# Patient Record
Sex: FEMALE | Race: WHITE | Employment: OTHER | URBAN - METROPOLITAN AREA
[De-identification: names, ages, dates, MRNs, and addresses within clinical notes are randomized per-mention and may not be internally consistent; named-entity substitution may affect disease eponyms.]

---

## 2019-04-06 ENCOUNTER — APPOINTMENT (OUTPATIENT)
Dept: CT IMAGING | Age: 64
End: 2019-04-06
Payer: COMMERCIAL

## 2019-04-06 ENCOUNTER — HOSPITAL ENCOUNTER (OUTPATIENT)
Age: 64
Discharge: HOME | End: 2019-04-06
Payer: COMMERCIAL

## 2019-04-06 ENCOUNTER — HOSPITAL ENCOUNTER (EMERGENCY)
Age: 64
Discharge: ANOTHER HOSPITAL | End: 2019-04-06
Attending: EMERGENCY MEDICINE
Payer: COMMERCIAL

## 2019-04-06 ENCOUNTER — APPOINTMENT (OUTPATIENT)
Dept: GENERAL RADIOLOGY | Age: 64
End: 2019-04-06
Payer: COMMERCIAL

## 2019-04-06 ENCOUNTER — HOSPITAL ENCOUNTER (INPATIENT)
Age: 64
LOS: 4 days | Discharge: HOME | DRG: 315 | End: 2019-04-10
Attending: INTERNAL MEDICINE | Admitting: INTERNAL MEDICINE
Payer: COMMERCIAL

## 2019-04-06 VITALS
HEIGHT: 58 IN | HEART RATE: 112 BPM | SYSTOLIC BLOOD PRESSURE: 133 MMHG | TEMPERATURE: 99.4 F | RESPIRATION RATE: 18 BRPM | WEIGHT: 120 LBS | DIASTOLIC BLOOD PRESSURE: 63 MMHG | OXYGEN SATURATION: 98 % | BODY MASS INDEX: 25.19 KG/M2

## 2019-04-06 DIAGNOSIS — D64.9 ANEMIA, UNSPECIFIED TYPE: ICD-10-CM

## 2019-04-06 DIAGNOSIS — J98.11 COMPRESSION ATELECTASIS: ICD-10-CM

## 2019-04-06 DIAGNOSIS — J90 PLEURAL EFFUSION, LEFT: ICD-10-CM

## 2019-04-06 DIAGNOSIS — I31.39 PERICARDIAL EFFUSION: Primary | ICD-10-CM

## 2019-04-06 DIAGNOSIS — J96.01 ACUTE RESPIRATORY FAILURE WITH HYPOXIA (HCC): ICD-10-CM

## 2019-04-06 LAB
ANION GAP SERPL CALCULATED.3IONS-SCNC: 13 MMOL/L (ref 7–16)
APTT: 26 SEC (ref 24.5–35.1)
BASOPHILS ABSOLUTE: 0.03 E9/L (ref 0–0.2)
BASOPHILS RELATIVE PERCENT: 0.3 % (ref 0–2)
BUN BLDV-MCNC: 12 MG/DL (ref 8–23)
C-REACTIVE PROTEIN: 24.5 MG/DL (ref 0–0.4)
CALCIUM SERPL-MCNC: 8.6 MG/DL (ref 8.6–10.2)
CHLORIDE BLD-SCNC: 105 MMOL/L (ref 98–107)
CO2: 23 MMOL/L (ref 22–29)
CREAT SERPL-MCNC: 0.5 MG/DL (ref 0.5–1)
D DIMER: 3510 NG/ML DDU
EOSINOPHILS ABSOLUTE: 0.01 E9/L (ref 0.05–0.5)
EOSINOPHILS RELATIVE PERCENT: 0.1 % (ref 0–6)
GFR AFRICAN AMERICAN: >60
GFR NON-AFRICAN AMERICAN: >60 ML/MIN/1.73
GLUCOSE BLD-MCNC: 112 MG/DL (ref 74–99)
HCT VFR BLD CALC: 27.8 % (ref 34–48)
HEMOGLOBIN: 8.9 G/DL (ref 11.5–15.5)
IMMATURE GRANULOCYTES #: 0.05 E9/L
IMMATURE GRANULOCYTES %: 0.4 % (ref 0–5)
INFLUENZA A BY PCR: NOT DETECTED
INFLUENZA B BY PCR: NOT DETECTED
INR BLD: 1.5
LACTIC ACID: 1 MMOL/L (ref 0.5–2.2)
LV EF: 60 %
LVEF MODALITY: NORMAL
LYMPHOCYTES ABSOLUTE: 2.03 E9/L (ref 1.5–4)
LYMPHOCYTES RELATIVE PERCENT: 18 % (ref 20–42)
MCH RBC QN AUTO: 29.6 PG (ref 26–35)
MCHC RBC AUTO-ENTMCNC: 32 % (ref 32–34.5)
MCV RBC AUTO: 92.4 FL (ref 80–99.9)
MONOCYTES ABSOLUTE: 1.02 E9/L (ref 0.1–0.95)
MONOCYTES RELATIVE PERCENT: 9.1 % (ref 2–12)
NEUTROPHILS ABSOLUTE: 8.11 E9/L (ref 1.8–7.3)
NEUTROPHILS RELATIVE PERCENT: 72.1 % (ref 43–80)
PDW BLD-RTO: 13.3 FL (ref 11.5–15)
PLATELET # BLD: 457 E9/L (ref 130–450)
PMV BLD AUTO: 8.7 FL (ref 7–12)
POTASSIUM REFLEX MAGNESIUM: 3.9 MMOL/L (ref 3.5–5)
PRO-BNP: 588 PG/ML (ref 0–125)
PROTHROMBIN TIME: 16.4 SEC (ref 9.3–12.4)
RBC # BLD: 3.01 E12/L (ref 3.5–5.5)
SEDIMENTATION RATE, ERYTHROCYTE: 130 MM/HR (ref 0–20)
SODIUM BLD-SCNC: 141 MMOL/L (ref 132–146)
TROPONIN: <0.01 NG/ML (ref 0–0.03)
WBC # BLD: 11.3 E9/L (ref 4.5–11.5)

## 2019-04-06 PROCEDURE — 85730 THROMBOPLASTIN TIME PARTIAL: CPT

## 2019-04-06 PROCEDURE — 6360000004 HC RX CONTRAST MEDICATION: Performed by: RADIOLOGY

## 2019-04-06 PROCEDURE — 36415 COLL VENOUS BLD VENIPUNCTURE: CPT

## 2019-04-06 PROCEDURE — 2580000003 HC RX 258: Performed by: RADIOLOGY

## 2019-04-06 PROCEDURE — 83880 ASSAY OF NATRIURETIC PEPTIDE: CPT

## 2019-04-06 PROCEDURE — 71045 X-RAY EXAM CHEST 1 VIEW: CPT

## 2019-04-06 PROCEDURE — 99223 1ST HOSP IP/OBS HIGH 75: CPT | Performed by: INTERNAL MEDICINE

## 2019-04-06 PROCEDURE — 84484 ASSAY OF TROPONIN QUANT: CPT

## 2019-04-06 PROCEDURE — 2000000000 HC ICU R&B

## 2019-04-06 PROCEDURE — 6370000000 HC RX 637 (ALT 250 FOR IP): Performed by: INTERNAL MEDICINE

## 2019-04-06 PROCEDURE — 93306 TTE W/DOPPLER COMPLETE: CPT

## 2019-04-06 PROCEDURE — 85651 RBC SED RATE NONAUTOMATED: CPT

## 2019-04-06 PROCEDURE — 99253 IP/OBS CNSLTJ NEW/EST LOW 45: CPT | Performed by: THORACIC SURGERY (CARDIOTHORACIC VASCULAR SURGERY)

## 2019-04-06 PROCEDURE — 99285 EMERGENCY DEPT VISIT HI MDM: CPT

## 2019-04-06 PROCEDURE — 85610 PROTHROMBIN TIME: CPT

## 2019-04-06 PROCEDURE — 85025 COMPLETE CBC W/AUTO DIFF WBC: CPT

## 2019-04-06 PROCEDURE — 83605 ASSAY OF LACTIC ACID: CPT

## 2019-04-06 PROCEDURE — 80048 BASIC METABOLIC PNL TOTAL CA: CPT

## 2019-04-06 PROCEDURE — 2580000003 HC RX 258: Performed by: EMERGENCY MEDICINE

## 2019-04-06 PROCEDURE — 94660 CPAP INITIATION&MGMT: CPT

## 2019-04-06 PROCEDURE — A0426 ALS 1: HCPCS

## 2019-04-06 PROCEDURE — 87502 INFLUENZA DNA AMP PROBE: CPT

## 2019-04-06 PROCEDURE — 71260 CT THORAX DX C+: CPT

## 2019-04-06 PROCEDURE — A0425 GROUND MILEAGE: HCPCS

## 2019-04-06 PROCEDURE — 85378 FIBRIN DEGRADE SEMIQUANT: CPT

## 2019-04-06 PROCEDURE — 86140 C-REACTIVE PROTEIN: CPT

## 2019-04-06 RX ORDER — ROSUVASTATIN CALCIUM 20 MG/1
20 TABLET, COATED ORAL DAILY
Status: DISCONTINUED | OUTPATIENT
Start: 2019-04-06 | End: 2019-04-10 | Stop reason: HOSPADM

## 2019-04-06 RX ORDER — DOXYCYCLINE HYCLATE 100 MG/1
100 CAPSULE ORAL 2 TIMES DAILY
Status: ON HOLD | COMMUNITY
Start: 2019-04-02 | End: 2019-04-10 | Stop reason: HOSPADM

## 2019-04-06 RX ORDER — SODIUM CHLORIDE 0.9 % (FLUSH) 0.9 %
10 SYRINGE (ML) INJECTION PRN
Status: DISCONTINUED | OUTPATIENT
Start: 2019-04-06 | End: 2019-04-10 | Stop reason: HOSPADM

## 2019-04-06 RX ORDER — PANTOPRAZOLE SODIUM 40 MG/1
40 TABLET, DELAYED RELEASE ORAL
Status: DISCONTINUED | OUTPATIENT
Start: 2019-04-07 | End: 2019-04-10 | Stop reason: HOSPADM

## 2019-04-06 RX ORDER — PANTOPRAZOLE SODIUM 40 MG/10ML
40 INJECTION, POWDER, LYOPHILIZED, FOR SOLUTION INTRAVENOUS ONCE
Status: DISCONTINUED | OUTPATIENT
Start: 2019-04-06 | End: 2019-04-06

## 2019-04-06 RX ORDER — B-COMPLEX WITH VITAMIN C
1 TABLET ORAL 2 TIMES DAILY
COMMUNITY
End: 2019-05-23 | Stop reason: ALTCHOICE

## 2019-04-06 RX ORDER — ONDANSETRON 2 MG/ML
4 INJECTION INTRAMUSCULAR; INTRAVENOUS EVERY 6 HOURS PRN
Status: DISCONTINUED | OUTPATIENT
Start: 2019-04-06 | End: 2019-04-10 | Stop reason: HOSPADM

## 2019-04-06 RX ORDER — KETOTIFEN FUMARATE 0.35 MG/ML
1 SOLUTION/ DROPS OPHTHALMIC 2 TIMES DAILY
Status: DISCONTINUED | OUTPATIENT
Start: 2019-04-06 | End: 2019-04-08

## 2019-04-06 RX ORDER — KETOTIFEN FUMARATE 0.35 MG/ML
1 SOLUTION/ DROPS OPHTHALMIC 2 TIMES DAILY PRN
COMMUNITY
End: 2019-05-23 | Stop reason: ALTCHOICE

## 2019-04-06 RX ORDER — ROSUVASTATIN CALCIUM 20 MG/1
20 TABLET, COATED ORAL
COMMUNITY

## 2019-04-06 RX ORDER — SODIUM CHLORIDE 0.9 % (FLUSH) 0.9 %
10 SYRINGE (ML) INJECTION EVERY 12 HOURS SCHEDULED
Status: DISCONTINUED | OUTPATIENT
Start: 2019-04-06 | End: 2019-04-10 | Stop reason: HOSPADM

## 2019-04-06 RX ORDER — SODIUM CHLORIDE 0.9 % (FLUSH) 0.9 %
10 SYRINGE (ML) INJECTION 2 TIMES DAILY
Status: DISCONTINUED | OUTPATIENT
Start: 2019-04-06 | End: 2019-04-06 | Stop reason: HOSPADM

## 2019-04-06 RX ORDER — 0.9 % SODIUM CHLORIDE 0.9 %
1000 INTRAVENOUS SOLUTION INTRAVENOUS ONCE
Status: COMPLETED | OUTPATIENT
Start: 2019-04-06 | End: 2019-04-06

## 2019-04-06 RX ORDER — SODIUM CHLORIDE 0.9 % (FLUSH) 0.9 %
10 SYRINGE (ML) INJECTION PRN
Status: DISCONTINUED | OUTPATIENT
Start: 2019-04-06 | End: 2019-04-06 | Stop reason: HOSPADM

## 2019-04-06 RX ADMIN — SODIUM CHLORIDE 1000 ML: 9 INJECTION, SOLUTION INTRAVENOUS at 12:00

## 2019-04-06 RX ADMIN — SODIUM CHLORIDE 1000 ML: 9 INJECTION, SOLUTION INTRAVENOUS at 13:55

## 2019-04-06 RX ADMIN — IOPAMIDOL 65 ML: 755 INJECTION, SOLUTION INTRAVENOUS at 13:11

## 2019-04-06 RX ADMIN — Medication 10 ML: at 13:11

## 2019-04-06 RX ADMIN — ROSUVASTATIN CALCIUM 20 MG: 20 TABLET, FILM COATED ORAL at 20:29

## 2019-04-06 RX ADMIN — KETOTIFEN FUMARATE 1 DROP: 0.35 SOLUTION/ DROPS OPHTHALMIC at 20:30

## 2019-04-06 ASSESSMENT — ENCOUNTER SYMPTOMS
SHORTNESS OF BREATH: 1
NAUSEA: 0
CONSTIPATION: 0
STRIDOR: 0
WHEEZING: 0
COUGH: 1
DIARRHEA: 0
VOMITING: 0
RHINORRHEA: 0
CHEST TIGHTNESS: 1
SINUS PAIN: 0
BLOOD IN STOOL: 0
ABDOMINAL PAIN: 0

## 2019-04-06 ASSESSMENT — PAIN DESCRIPTION - DESCRIPTORS: DESCRIPTORS: PATIENT UNABLE TO DESCRIBE

## 2019-04-06 ASSESSMENT — PAIN SCALES - GENERAL
PAINLEVEL_OUTOF10: 0
PAINLEVEL_OUTOF10: 8

## 2019-04-06 ASSESSMENT — PAIN DESCRIPTION - FREQUENCY: FREQUENCY: CONTINUOUS

## 2019-04-06 ASSESSMENT — PAIN DESCRIPTION - LOCATION: LOCATION: CHEST

## 2019-04-06 ASSESSMENT — PAIN DESCRIPTION - PROGRESSION: CLINICAL_PROGRESSION: NOT CHANGED

## 2019-04-06 ASSESSMENT — PAIN DESCRIPTION - ONSET: ONSET: GRADUAL

## 2019-04-06 ASSESSMENT — PAIN DESCRIPTION - PAIN TYPE: TYPE: ACUTE PAIN

## 2019-04-06 ASSESSMENT — PAIN DESCRIPTION - ORIENTATION: ORIENTATION: LOWER;RIGHT;LEFT

## 2019-04-06 ASSESSMENT — PAIN - FUNCTIONAL ASSESSMENT: PAIN_FUNCTIONAL_ASSESSMENT: ACTIVITIES ARE NOT PREVENTED

## 2019-04-06 NOTE — PROGRESS NOTES
Date: 4/6/2019    Time: 5:08 PM    Patient Placed On BIPAP/CPAP/ Non-Invasive Ventilation? Yes    If no must comment. Facial area red/color change? No           If YES are Blister/Lesion present? No   If yes must notify nursing staff  BIPAP/CPAP skin barrier? No    Skin barrier type:none, nasal prongs in use       Comments: Placed on bipap in 4403, transfer from [facility].         Melissa العراقي

## 2019-04-06 NOTE — CONSULTS
[facility])  Department of Internal Medicine   MICU H&P Note    Patient:  Ayla Or 59 y.o. female   MRN: [id number]       Date of Service: 4/6/2019      Chief Complaint:  sob    History of Present Illness      History obtained from patient    This is a 59year old female with a PMHx of HLD and osteoporosis who presented to [facility] ED with sob. Symptoms started 1 week prior suddenly and progressively worsened, occurring both at rest and on exertion. Associated with non-productive cough, fever, chest discomfort. Denies palpitations, diaphoresis, orthopnea, PND, leg swelling or tenderness, nausea, vomiting, diarrhea, dysuria, sick contacts or long distance travel. She had called her PCP 4/2/19 and was given doxycycline, but did not experience any relief. She then made her way to [facility] ED, where she was found to be hypoxic and tachycardic. CT chest showing a large pericardial effusion 2.7 cm, Echo done showed EF 60% effusion 1.5 cm-1.8cm without tamponade, mild PHTN 40 mmHg. Vascular was consulted and the decision was made to transfer her to [facility] MICU for further management. PastMedical History:      Diagnosis Date    Hyperlipidemia     Osteoporosis        Past Surgical History:        Procedure Laterality Date    CYST REMOVAL      L leg    LEG SURGERY      fx repair    TONSILLECTOMY         Prior to Admission medications    Medication Sig Start Date End Date Taking?  Authorizing Provider   Alendronate Sodium (FOSAMAX PO) Take 70 mg by mouth     Historical Provider, MD   NONFORMULARY Cholesterol med-does not know the name    Historical Provider, MD   doxycycline hyclate (VIBRAMYCIN) 100 MG capsule Take 100 mg by mouth 2 times daily 4/2/19   Historical Provider, MD   rosuvastatin (CRESTOR) 20 MG tablet Take 20 mg by mouth daily    Historical Provider, MD   ketotifen (ZADITOR) 0.025 % ophthalmic solution Place 1 drop into both eyes 2 times daily    Historical Provider, MD   Cholecalciferol (VITAMIN D3) 5000 units TABS Take 1 tablet by mouth daily    Historical Provider, MD   calcium carbonate-vitamin D (RA CALCIUM PLUS VITAMIN D) 600-200 MG-UNIT TABS Take 1 tablet by mouth 2 times daily    Historical Provider, MD       Allergies:  Penicillins    Social History:   TOBACCO:   reports that she has never smoked. She has never used smokeless tobacco.  ETOH:   reports that she drinks alcohol. OCCUPATION:      Family History:       Problem Relation Age of Onset    Diabetes Father         REVIEW OF SYSTEMS:  Review of Systems   Constitutional: Positive for activity change and fever. Negative for chills, diaphoresis and fatigue. HENT: Negative for congestion, rhinorrhea and sinus pain. Respiratory: Positive for cough, chest tightness and shortness of breath. Negative for wheezing and stridor. Cardiovascular: Positive for chest pain. Negative for palpitations and leg swelling. Gastrointestinal: Negative for abdominal pain, blood in stool, constipation, diarrhea, nausea and vomiting. Genitourinary: Negative for dysuria, frequency and hematuria. Musculoskeletal: Positive for arthralgias. Negative for gait problem and myalgias. Skin: Negative for rash. Allergic/Immunologic: Negative for environmental allergies. Neurological: Negative for dizziness, tremors, syncope, weakness, numbness and headaches. Hematological: Does not bruise/bleed easily. Psychiatric/Behavioral: Negative for suicidal ideas. Physical Exam       Physical Exam:  · Vitals: /73   Pulse 110   Temp 98.4 °F (36.9 °C) (Temporal)   Resp (!) 35   Ht 4' 11\" (1.499 m)   Wt 128 lb 8 oz (58.3 kg)   SpO2 97%   BMI 25.95 kg/m²     · I & O - 24hr: No intake/output data recorded. Physical Exam   Constitutional: She is oriented to person, place, and time. She appears well-developed and well-nourished. No distress. HENT:   Head: Normocephalic and atraumatic. Eyes: Pupils are equal, round, and reactive to light.  Conjunctivae are normal. No scleral icterus. Cardiovascular: Normal rate, regular rhythm, S1 normal, S2 normal, normal heart sounds and intact distal pulses. No murmur heard. Pulmonary/Chest: Effort normal. No respiratory distress. She has no wheezes. She has no rales. Currently on Bi-PAP    Left U/L and right LL decreased bs   Abdominal: Soft. Bowel sounds are normal. She exhibits no distension and no mass. There is no tenderness. There is no guarding. Musculoskeletal: She exhibits no edema or tenderness. Neurological: She is alert and oriented to person, place, and time. No cranial nerve deficit. Skin: Skin is warm and dry. She is not diaphoretic. Labs     CBC:   Lab Results   Component Value Date    WBC 11.3 04/06/2019    RBC 3.01 04/06/2019    HGB 8.9 04/06/2019    HCT 27.8 04/06/2019     04/06/2019    MCV 92.4 04/06/2019     BMP:  @BRIEFLAB(NA,K,CL,CO2,BUN,CREATININE,GLUCOSE,CALCIUM)@  Hepatic Function Panel:  No results found for: ALKPHOS, AST, ALT, PROT, LABALBU, BILITOT  Cardiac Enzymes: @RESUFAST(CKTOTAL:3,CKMB:3,CKMBINDEX:3,TROPONINI:3)@  PT/INR:    Lab Results   Component Value Date    PROTIME 16.4 04/06/2019    INR 1.5 04/06/2019     ABG:  No results found for: PHART, MLU0TYK, PO2ART, R3UULIYH, OSF1LSY, BEART  TSH:  No results found for: TSH    Resident's Assessment & Plan       Cardiovascular  Pericardial effusion  - likely 2/2 pericarditis, will continue evaluate for etiology  - no obvious evidence of cardiac tamponade or hemodynamic compromise  - CT chest showing large pericardial effusion 2.7 cm, Echo done showed EF 60% effusion 1.5 cm - 1.8cm without tamponade(no RV free wall or right atrial wall diastolic collapse, mild PHTN 40 mmHg.   - , CRP 24.5, troponin negative  - EKG not showing low voltage or pulsus alternans  - CTS consulted for possible pericardiocentesis  - obtain SHRADDHA, HIV, Hepatitis panel, viral panel, IgG4, procalcitonin, TSH,   - consider JV for further evaluation  - for incentive spirometry  - monitor vital signs closely    Hematology  Normocytic anemia  - Hb 8.9 on admission  - obtain iron studies, b12, folate  - obtain FOBT  - monitor CBC daily      DVT/GI prophylaxis: PCD/Protonix  Disposition: Jos Milligan M.D. , PGY-1  Internal Medicine    Attending Physician: Dr. Robbie Valdez ICU Attending Statement     Esther Tang was seen, examined and discussed with the multi-disciplinary ICU team during rounds. A addendum note may be separate to the residents note. If not then, I have personally seen and examined the patient and the key elements of the encounter were performed by me (> 85 % time). The medications & laboratory data was discussed and adjusted where necessary. The radiographic images were reviewed either as a group or with radiologist.  Any changes are document or changed if felt dis-concordant with the exam or history. The above findings were corroborated, plans confirmed and changes made if needed. Family was updated at the bedside as available. Key issues of the case were discussed among consultants. Critical Care time is documented if appropriate.       Lucila Calvo DO, MPH  Professor of Medicine

## 2019-04-06 NOTE — PROGRESS NOTES
Date: 4/6/2019    Time: 3:06 PM    Patient Placed On BIPAP/CPAP/ Non-Invasive Ventilation? Yes    If no must comment. Facial area red/color change? No           If YES are Blister/Lesion present? No   If yes must notify nursing staff  BIPAP/CPAP skin barrier? No    Skin barrier type:othernasal prongs      Comments: does not tolerate full/nasal mask, nasal prongs used.         Valerie Barge

## 2019-04-06 NOTE — ED PROVIDER NOTES
Department of Emergency Medicine   ED  Provider Note  Admit Date/RoomTime: 4/6/2019 10:34 AM  ED Room: 23/23          History of Present Illness:  4/6/19, Time: 11:11 AM         Katy Ruffin is a 59 y.o. female presenting to the ED for shortness of breath, beginning 1 week ago. The complaint has been persistent, severe in severity, and worsened by any exertion. She called in to her PMD last week and was given an Rx for doxycycline which did not help her symptoms at all. She went to the [facility] today and was noted to be hypoxic at rest (87%) which worsened with ambulation to 83%. She denies any productive cough. No recent travel or leg swelling. Review of Systems:   Pertinent positives and negatives are stated within HPI, all other systems reviewed and are negative.        --------------------------------------------- PAST HISTORY ---------------------------------------------  Past Medical History:  has a past medical history of Hyperlipidemia and Osteoporosis. Past Surgical History:  has a past surgical history that includes cyst removal; Tonsillectomy; and Leg Surgery. Social History:  reports that she has never smoked. She has never used smokeless tobacco. She reports that she drinks alcohol. She reports that she does not use drugs. Family History: family history includes Diabetes in her father. The patients home medications have been reviewed. Allergies: Penicillins        ---------------------------------------------------PHYSICAL EXAM--------------------------------------    Constitutional/General: Alert and oriented x3  Head: Normocephalic and atraumatic  Eyes: PERRL, EOMI, conjunctiva normal, sclera non icteric  Mouth: Oropharynx clear, handling secretions, no trismus, no asymmetry of the posterior oropharynx or uvular edema  Neck: Supple, full ROM, no stridor, no meningeal signs  Respiratory: Lungs clear to auscultation bilaterally, no wheezes, rales, or rhonchi.  Not in respiratory distress  Cardiovascular:  Tachycardic. Regular rhythm. Pericardial friction rub appreciated  Chest: No chest wall tenderness  GI:  Abdomen Soft, Non tender, Non distended. +BS. No rebound, guarding, or rigidity. No pulsatile masses. Musculoskeletal: Moves all extremities x 4. Warm and well perfused, no clubbing, cyanosis, or edema. Capillary refill <3 seconds  Integument: skin warm and dry. No rashes. Neurologic: GCS 15, no focal deficits, symmetric strength 5/5 in the upper and lower extremities bilaterally  Psychiatric: Normal Affect          -------------------------------------------------- RESULTS -------------------------------------------------  I have personally reviewed all laboratory and imaging results for this patient. Results are listed below.      LABS:  Results for orders placed or performed during the hospital encounter of 04/06/19   Rapid influenza A/B antigens   Result Value Ref Range    Influenza A by PCR Not Detected Not Detected    Influenza B by PCR Not Detected Not Detected   CBC Auto Differential   Result Value Ref Range    WBC 11.3 4.5 - 11.5 E9/L    RBC 3.01 (L) 3.50 - 5.50 E12/L    Hemoglobin 8.9 (L) 11.5 - 15.5 g/dL    Hematocrit 27.8 (L) 34.0 - 48.0 %    MCV 92.4 80.0 - 99.9 fL    MCH 29.6 26.0 - 35.0 pg    MCHC 32.0 32.0 - 34.5 %    RDW 13.3 11.5 - 15.0 fL    Platelets 155 (H) 373 - 450 E9/L    MPV 8.7 7.0 - 12.0 fL    Neutrophils % 72.1 43.0 - 80.0 %    Immature Granulocytes % 0.4 0.0 - 5.0 %    Lymphocytes % 18.0 (L) 20.0 - 42.0 %    Monocytes % 9.1 2.0 - 12.0 %    Eosinophils % 0.1 0.0 - 6.0 %    Basophils % 0.3 0.0 - 2.0 %    Neutrophils # 8.11 (H) 1.80 - 7.30 E9/L    Immature Granulocytes # 0.05 E9/L    Lymphocytes # 2.03 1.50 - 4.00 E9/L    Monocytes # 1.02 (H) 0.10 - 0.95 E9/L    Eosinophils # 0.01 (L) 0.05 - 0.50 E9/L    Basophils # 0.03 0.00 - 0.20 U8/K   Basic Metabolic Panel w/ Reflex to MG   Result Value Ref Range    Sodium 141 132 - 146 mmol/L    Potassium tachycardia  Rate: normal  Axis: normal  Ectopy: none  Conduction: normal  ST Segments: no acute change  T Waves: no acute change  Q Waves: none    Clinical Impression: no acute changes    Chelsey Mitchell      ------------------------------ ED COURSE/MEDICAL DECISION MAKING----------------------  Medications   sodium chloride flush 0.9 % injection 10 mL (has no administration in time range)   sodium chloride flush 0.9 % injection 10 mL (10 mLs Intravenous Given 4/6/19 1311)   perflutren lipid microspheres (DEFINITY) injection 1.65 mg (has no administration in time range)   0.9 % sodium chloride bolus (0 mLs Intravenous Stopped 4/6/19 1329)   iopamidol (ISOVUE-370) 76 % injection 65 mL (65 mLs Intravenous Given 4/6/19 1311)   0.9 % sodium chloride bolus (1,000 mLs Intravenous New Bag 4/6/19 1355)       Procedures  BEDSIDE CARDIAC ULTRASOUND    Risks, benefits and alternatives (for applicable procedures below) described. Performed By: Chelsey Mitchell DO. Indication(s):  SOB, effusion  Informed consent: The patient provided verbal consent for this procedure. .  Procedural Quadrants/Interpretations:     SUBXYPHOID:  Abnormal  anechoic stripe seen around the heart. PSLA: Views of mitral valve were obtained. EPSS 6.8 mm  PSSA: Regional wall motion abnormalities  were not seen  APICAL 4-CHAMBER: 4 chamber views were obtained. Findings: Pericardial effusion was present - large  Dilation or systolic collapse of RV was seen. TAPSE 14 mm  EF grossly estimated normal    This procedure was performed by Chelsey Mitchell on 4/6/19 at 4:16 PM         Medical Decision Making:    Patient presents to the ED c/o SOB. She was referred here by the urgent care to rule out PE. D-dimer was initially ordered but chest x-ray showed a substantially enlarged heart and a blunted diaphragm and she had a CTA Chest ordered which demonstrated a large pericardial effusion.  Bedside ultrasound was done and showed the effusion which was even visible from the left lower lobe lung area. Limited views obtained appeared consistent with RV but not RA collapse. Care was coordinated with multiple consultants to adequately care for the patient. She was given IVF to maximize her preload and decrease the risk of decompensation. She was also started on BIPAP but required the nasal BIPAP as she could not tolerate the mask. The BIPAP substantially improved her work of breathing and she appeared much more comfortable. Re-Evaluations:             ED Course as of Apr 06 1616   Sat Apr 06, 2019   1347 Dr. Jeremy Herring will read stat echo    [AD]   1478 Discussed case with Dr. Julio Urena who will see the patient in consultation after transfer. He states there will not be anything to do today since she is hemodynamically stable.    [AD]   1112 Discussed case with Dr. Zeferino Ling who will admit the patient.      [AD]   [phone] Dr. Paolo Yee accepted via [facility] text. [AD]      ED Course User Index  [AD] Rafy Chapman, DO     Please note that the withdrawal or failure to initiate urgent interventions for this patient would likely result in a life threatening deterioration or permanent disability. Accordingly this patient received 90 minutes of critical care time, excluding separately billable procedures. Counseling: The emergency provider has spoken with the patient and discussed todays results, in addition to providing specific details for the plan of care and counseling regarding the diagnosis and prognosis. Questions are answered at this time and they are agreeable with the plan.       --------------------------------- IMPRESSION AND DISPOSITION ---------------------------------    IMPRESSION  1. Pericardial effusion    2. Acute respiratory failure with hypoxia (HCC)    3. Compression atelectasis    4. Pleural effusion, left    5.  Anemia, unspecified type        DISPOSITION  Disposition: Transfer to [facility]  Patient condition is serious        NOTE: This report was transcribed using voice recognition software.  Every effort was made to ensure accuracy; however, inadvertent computerized transcription errors may be present        Dayanara Lisa DO  Resident  04/06/19 5641

## 2019-04-06 NOTE — CONSULTS
CARDIOTHORACIC SURGERY  CONSULT NOTE  4/6/2019    Physician Consulted: Dr. Elaine Figueroa  Reason for Consult: Pericardial effusion  Referring Physician: Dr. Wilner Ruggiero    JEVON Welsh is a 59 y.o. female who presents for evaluation of shortness of breath at rest and with exertion for one week. Treated by PCP w/ doxycycline, but symptoms did not improve. Went to [facility] ER and found to have a large pericardial effusion with bilateral pleural effusions. She denied fever, chills, weight loss, abdominal pain and n/v. No sick contacts. No recent travels. Non-smoker. Past Medical History:   Diagnosis Date    Hyperlipidemia     Osteoporosis        Past Surgical History:   Procedure Laterality Date    CYST REMOVAL      L leg    LEG SURGERY      fx repair    TONSILLECTOMY         Medications Prior to Admission:    Prior to Admission medications    Medication Sig Start Date End Date Taking?  Authorizing Provider   Alendronate Sodium (FOSAMAX PO) Take 70 mg by mouth     Historical Provider, MD   NONFORMULARY Cholesterol med-does not know the name    Historical Provider, MD   doxycycline hyclate (VIBRAMYCIN) 100 MG capsule Take 100 mg by mouth 2 times daily 4/2/19   Historical Provider, MD   rosuvastatin (CRESTOR) 20 MG tablet Take 20 mg by mouth daily    Historical Provider, MD   ketotifen (ZADITOR) 0.025 % ophthalmic solution Place 1 drop into both eyes 2 times daily    Historical Provider, MD   Cholecalciferol (VITAMIN D3) 5000 units TABS Take 1 tablet by mouth daily    Historical Provider, MD   calcium carbonate-vitamin D (RA CALCIUM PLUS VITAMIN D) 600-200 MG-UNIT TABS Take 1 tablet by mouth 2 times daily    Historical Provider, MD       Allergies   Allergen Reactions    Penicillins Rash       Family History   Problem Relation Age of Onset    Diabetes Father        Social History     Tobacco Use    Smoking status: Never Smoker    Smokeless tobacco: Never Used   Substance Use Topics    Alcohol use: Yes     Comment: special occasions    Drug use: No         Review of Systems   General ROS: negative for - chills, fatigue, fever, night sweats or weight loss  Hematological and Lymphatic ROS: negative for - jaundice, night sweats or pallor  Respiratory ROS: positive for - shortness of breath  Cardiovascular ROS: positive dyspnea on exertion  Gastrointestinal ROS: no abdominal pain, change in bowel habits, or black or bloody stools  Genito-Urinary ROS: no dysuria, trouble voiding, or hematuria  Musculoskeletal ROS: negative for - muscle pain or muscular weakness      PHYSICAL EXAM:    Vitals:    19 1800   BP: 122/78   Pulse: 109   Resp: 29   Temp:    SpO2:        General: NAD, awake and alert. A&Ox3  Head: Normocephalic, atraumatic  Eyes: PERRLA, EOMI. No sclera icterus. Lungs: No increased work of breathing. Decreased breath sounds on the left. No W/R/R. Cardiovascular: RRR. Muffled heart sounds. Abdomen: Soft, ND, NT. +BS. No rebound, guarding or rigidity. Extremities: Atraumatic, full range of motion  Skin: Warm, dry and intact    LABS:  CBC  Recent Labs     19  1156   WBC 11.3   HGB 8.9*   HCT 27.8*   *     BMP  Recent Labs     19  1156      K 3.9      CO2 23   BUN 12   CREATININE 0.5   CALCIUM 8.6     Liver Function  No results for input(s): AMYLASE, LIPASE, BILITOT, BILIDIR, AST, ALT, ALKPHOS, PROT, LABALBU in the last 72 hours. No results for input(s): LACTATE in the last 72 hours. Recent Labs     19  1156   INR 1.5       RADIOLOGY  Xr Chest Portable    Result Date: 2019  Patient MRN:  [id number] : 1955 Age: 59 years Gender: Female Order Date:  2019 11:00 AM Exam: XR CHEST PORTABLE Number of views: Portable upright AP view of the chest, 1 image(s) Indication: Shortness of breath Comparison: Chest radiograph dated 7/3/2015 FINDINGS:  The cardiac silhouette appears large.  There is silhouetting of the left hemidiaphragm and left cardiac margin, compatible with the presence of pleural fluid, atelectasis, and/or airspace consolidation in the left lower lung. There is no pneumothorax. Findings compatible with left pleural effusion and associated atelectasis and/or airspace consolidation in the left lower lung. Cardiomegaly. Ct Chest Pulmonary Embolism W Contrast    Result Date: 4/6/2019  Clinical indications: Chest pain. Pulmonary embolus. COMPARISON: Conventional radiographs of the chest April 6, 2019 and July 3, 2015. No prior CT chest. No-year-old on Mr. Document well the head Exposure control: This examination and all examinations utilizing ionizing radiation at this facility done so according to the ALARA (as low as reasonably achievable) principal for radiation dose reduction. Technique: Axial, sagittal, and coronal computed tomography of the chest was performed following intravenous administration of 90 mL of Isovue-370. 3-D postprocessing. Three-dimensional reconstructions of the arterial tree. MIP imaging. FINDINGS: Evaluation of the pulmonary arterial tree reveals no intraluminal filling defect to suggest embolic phenomenon. There is no evidence of aortic dissection. Within the thoracic inlet, the thyroid gland is unremarkable. The major airways are patent. There is no mediastinal or hilar mass or lymphadenopathy. There is a pericardial fluid collection which measures about 2.7 cm in greatest thickness/cross-section. The heart is upper limits normal in size. Evaluation of the lung parenchyma reveals small right and moderate left pleural effusions with contiguous atelectasis. The lungs are negative for dominant mass or airspace consolidation. There is no evidence for effusion, pneumothorax or severe interstitial change. Within the upper abdomen, there is no evidence for acute or worrisome process. Skeletal structures are negative for evidence of progressive process or acute fracture. No evidence for pulmonary embolism or aortic dissection.  Large pericardial effusion measuring up to 2.7 cm in greatest thickness/cross section. Small right and moderate left pleural effusions with contiguous atelectasis. ASSESSMENT:  59 y.o. female with a pericardial effusion and bilateral pleural effusions    PLAN:  No acute surgical intervention at this time  Diet as tolerated  Attending to see and make further recommendations    D/w Dr. Guy Lozano    Electronically signed by Lorin Field MD on 4/6/19 at 6:24 PM      As above. Seen and examined. Above findings confirmed. Pericardial effusion. BP fine.   Drainage tomorrow at [facility]

## 2019-04-07 ENCOUNTER — APPOINTMENT (OUTPATIENT)
Dept: ULTRASOUND IMAGING | Age: 64
DRG: 315 | End: 2019-04-07
Attending: INTERNAL MEDICINE
Payer: COMMERCIAL

## 2019-04-07 ENCOUNTER — APPOINTMENT (OUTPATIENT)
Dept: GENERAL RADIOLOGY | Age: 64
DRG: 315 | End: 2019-04-07
Attending: INTERNAL MEDICINE
Payer: COMMERCIAL

## 2019-04-07 ENCOUNTER — ANESTHESIA EVENT (OUTPATIENT)
Dept: OPERATING ROOM | Age: 64
DRG: 315 | End: 2019-04-07
Payer: COMMERCIAL

## 2019-04-07 LAB
ABO/RH: NORMAL
ACETAMINOPHEN LEVEL: <5 MCG/ML (ref 10–30)
ALBUMIN SERPL-MCNC: 3.1 G/DL (ref 3.5–5.2)
ALP BLD-CCNC: 170 U/L (ref 35–104)
ALT SERPL-CCNC: 155 U/L (ref 0–32)
AMPHETAMINE SCREEN, URINE: NOT DETECTED
ANION GAP SERPL CALCULATED.3IONS-SCNC: 15 MMOL/L (ref 7–16)
ANTIBODY SCREEN: NORMAL
AST SERPL-CCNC: 90 U/L (ref 0–31)
BACTERIA: ABNORMAL /HPF
BARBITURATE SCREEN URINE: NOT DETECTED
BASOPHILS ABSOLUTE: 0.02 E9/L (ref 0–0.2)
BASOPHILS RELATIVE PERCENT: 0.2 % (ref 0–2)
BENZODIAZEPINE SCREEN, URINE: NOT DETECTED
BILIRUB SERPL-MCNC: 0.4 MG/DL (ref 0–1.2)
BILIRUBIN DIRECT: <0.2 MG/DL (ref 0–0.3)
BILIRUBIN URINE: ABNORMAL
BILIRUBIN, INDIRECT: ABNORMAL MG/DL (ref 0–1)
BLOOD, URINE: ABNORMAL
BUN BLDV-MCNC: 11 MG/DL (ref 8–23)
CALCIUM SERPL-MCNC: 8.1 MG/DL (ref 8.6–10.2)
CANNABINOID SCREEN URINE: NOT DETECTED
CHLORIDE BLD-SCNC: 108 MMOL/L (ref 98–107)
CLARITY: CLEAR
CO2: 18 MMOL/L (ref 22–29)
COCAINE METABOLITE SCREEN URINE: NOT DETECTED
COLOR: YELLOW
CREAT SERPL-MCNC: 0.5 MG/DL (ref 0.5–1)
EOSINOPHILS ABSOLUTE: 0.02 E9/L (ref 0.05–0.5)
EOSINOPHILS RELATIVE PERCENT: 0.2 % (ref 0–6)
ETHANOL: <10 MG/DL (ref 0–0.08)
FERRITIN: 1085 NG/ML
FOLATE: 15.7 NG/ML (ref 4.8–24.2)
GFR AFRICAN AMERICAN: >60
GFR NON-AFRICAN AMERICAN: >60 ML/MIN/1.73
GLUCOSE BLD-MCNC: 113 MG/DL (ref 74–99)
GLUCOSE URINE: NEGATIVE MG/DL
HCT VFR BLD CALC: 30.3 % (ref 34–48)
HEMOGLOBIN: 9.5 G/DL (ref 11.5–15.5)
IMMATURE GRANULOCYTES #: 0.04 E9/L
IMMATURE GRANULOCYTES %: 0.4 % (ref 0–5)
IRON SATURATION: 10 % (ref 15–50)
IRON: 16 MCG/DL (ref 37–145)
KETONES, URINE: 15 MG/DL
LEUKOCYTE ESTERASE, URINE: NEGATIVE
LYMPHOCYTES ABSOLUTE: 1.98 E9/L (ref 1.5–4)
LYMPHOCYTES RELATIVE PERCENT: 21.9 % (ref 20–42)
MCH RBC QN AUTO: 28.7 PG (ref 26–35)
MCHC RBC AUTO-ENTMCNC: 31.4 % (ref 32–34.5)
MCV RBC AUTO: 91.5 FL (ref 80–99.9)
METHADONE SCREEN, URINE: NOT DETECTED
MONOCYTES ABSOLUTE: 0.81 E9/L (ref 0.1–0.95)
MONOCYTES RELATIVE PERCENT: 9 % (ref 2–12)
NEUTROPHILS ABSOLUTE: 6.16 E9/L (ref 1.8–7.3)
NEUTROPHILS RELATIVE PERCENT: 68.3 % (ref 43–80)
NITRITE, URINE: NEGATIVE
OPIATE SCREEN URINE: NOT DETECTED
PDW BLD-RTO: 13.3 FL (ref 11.5–15)
PH UA: 6 (ref 5–9)
PHENCYCLIDINE SCREEN URINE: NOT DETECTED
PLATELET # BLD: 492 E9/L (ref 130–450)
PMV BLD AUTO: 8.7 FL (ref 7–12)
POTASSIUM REFLEX MAGNESIUM: 3.6 MMOL/L (ref 3.5–5)
PROCALCITONIN: 0.1 NG/ML (ref 0–0.08)
PROPOXYPHENE SCREEN: NOT DETECTED
PROTEIN UA: 30 MG/DL
RBC # BLD: 3.31 E12/L (ref 3.5–5.5)
RBC UA: ABNORMAL /HPF (ref 0–2)
RHEUMATOID FACTOR: 14 IU/ML (ref 0–13)
SALICYLATE, SERUM: <0.3 MG/DL (ref 0–30)
SODIUM BLD-SCNC: 141 MMOL/L (ref 132–146)
SPECIFIC GRAVITY UA: >=1.03 (ref 1–1.03)
TOTAL IRON BINDING CAPACITY: 168 MCG/DL (ref 250–450)
TOTAL PROTEIN: 7.1 G/DL (ref 6.4–8.3)
TRICYCLIC ANTIDEPRESSANTS SCREEN SERUM: NEGATIVE NG/ML
TSH SERPL DL<=0.05 MIU/L-ACNC: 3.01 UIU/ML (ref 0.27–4.2)
UROBILINOGEN, URINE: 1 E.U./DL
VITAMIN B-12: 1433 PG/ML (ref 211–946)
WBC # BLD: 9 E9/L (ref 4.5–11.5)
WBC UA: ABNORMAL /HPF (ref 0–5)

## 2019-04-07 PROCEDURE — 6360000002 HC RX W HCPCS: Performed by: THORACIC SURGERY (CARDIOTHORACIC VASCULAR SURGERY)

## 2019-04-07 PROCEDURE — 86850 RBC ANTIBODY SCREEN: CPT

## 2019-04-07 PROCEDURE — 85025 COMPLETE CBC W/AUTO DIFF WBC: CPT

## 2019-04-07 PROCEDURE — 83540 ASSAY OF IRON: CPT

## 2019-04-07 PROCEDURE — 36415 COLL VENOUS BLD VENIPUNCTURE: CPT

## 2019-04-07 PROCEDURE — 2000000000 HC ICU R&B

## 2019-04-07 PROCEDURE — 84145 PROCALCITONIN (PCT): CPT

## 2019-04-07 PROCEDURE — 94660 CPAP INITIATION&MGMT: CPT

## 2019-04-07 PROCEDURE — 99233 SBSQ HOSP IP/OBS HIGH 50: CPT | Performed by: INTERNAL MEDICINE

## 2019-04-07 PROCEDURE — 80074 ACUTE HEPATITIS PANEL: CPT

## 2019-04-07 PROCEDURE — 87040 BLOOD CULTURE FOR BACTERIA: CPT

## 2019-04-07 PROCEDURE — 83550 IRON BINDING TEST: CPT

## 2019-04-07 PROCEDURE — 86225 DNA ANTIBODY NATIVE: CPT

## 2019-04-07 PROCEDURE — 86038 ANTINUCLEAR ANTIBODIES: CPT

## 2019-04-07 PROCEDURE — 80076 HEPATIC FUNCTION PANEL: CPT

## 2019-04-07 PROCEDURE — 71045 X-RAY EXAM CHEST 1 VIEW: CPT

## 2019-04-07 PROCEDURE — 80048 BASIC METABOLIC PNL TOTAL CA: CPT

## 2019-04-07 PROCEDURE — G0480 DRUG TEST DEF 1-7 CLASSES: HCPCS

## 2019-04-07 PROCEDURE — 82728 ASSAY OF FERRITIN: CPT

## 2019-04-07 PROCEDURE — 86900 BLOOD TYPING SEROLOGIC ABO: CPT

## 2019-04-07 PROCEDURE — 93970 EXTREMITY STUDY: CPT

## 2019-04-07 PROCEDURE — 80307 DRUG TEST PRSMV CHEM ANLYZR: CPT

## 2019-04-07 PROCEDURE — 82746 ASSAY OF FOLIC ACID SERUM: CPT

## 2019-04-07 PROCEDURE — 82607 VITAMIN B-12: CPT

## 2019-04-07 PROCEDURE — 86901 BLOOD TYPING SEROLOGIC RH(D): CPT

## 2019-04-07 PROCEDURE — 86703 HIV-1/HIV-2 1 RESULT ANTBDY: CPT

## 2019-04-07 PROCEDURE — 87186 SC STD MICRODIL/AGAR DIL: CPT

## 2019-04-07 PROCEDURE — 84443 ASSAY THYROID STIM HORMONE: CPT

## 2019-04-07 PROCEDURE — 86431 RHEUMATOID FACTOR QUANT: CPT

## 2019-04-07 PROCEDURE — 87088 URINE BACTERIA CULTURE: CPT

## 2019-04-07 PROCEDURE — P9046 ALBUMIN (HUMAN), 25%, 20 ML: HCPCS | Performed by: THORACIC SURGERY (CARDIOTHORACIC VASCULAR SURGERY)

## 2019-04-07 PROCEDURE — 6370000000 HC RX 637 (ALT 250 FOR IP)

## 2019-04-07 PROCEDURE — 2580000003 HC RX 258: Performed by: INTERNAL MEDICINE

## 2019-04-07 PROCEDURE — 81001 URINALYSIS AUTO W/SCOPE: CPT

## 2019-04-07 RX ORDER — ALBUMIN (HUMAN) 12.5 G/50ML
25 SOLUTION INTRAVENOUS ONCE
Status: COMPLETED | OUTPATIENT
Start: 2019-04-07 | End: 2019-04-07

## 2019-04-07 RX ORDER — ACETAMINOPHEN 325 MG/1
TABLET ORAL
Status: COMPLETED
Start: 2019-04-07 | End: 2019-04-07

## 2019-04-07 RX ORDER — CEFAZOLIN SODIUM 2 G/50ML
2 SOLUTION INTRAVENOUS SEE ADMIN INSTRUCTIONS
Status: COMPLETED | OUTPATIENT
Start: 2019-04-07 | End: 2019-04-08

## 2019-04-07 RX ADMIN — KETOTIFEN FUMARATE 1 DROP: 0.35 SOLUTION/ DROPS OPHTHALMIC at 08:20

## 2019-04-07 RX ADMIN — Medication 10 ML: at 20:21

## 2019-04-07 RX ADMIN — ACETAMINOPHEN 650 MG: 325 TABLET, FILM COATED ORAL at 20:20

## 2019-04-07 RX ADMIN — ALBUMIN (HUMAN) 25 G: 0.25 INJECTION, SOLUTION INTRAVENOUS at 08:19

## 2019-04-07 RX ADMIN — KETOTIFEN FUMARATE 1 DROP: 0.35 SOLUTION/ DROPS OPHTHALMIC at 20:21

## 2019-04-07 ASSESSMENT — PAIN SCALES - GENERAL
PAINLEVEL_OUTOF10: 0

## 2019-04-07 NOTE — H&P
[facility]  [facility] and Physical      CHIEF COMPLAINT:  Shortness of breath    Reason for Admission:  Pericardial effusion    History Obtained From:  Patient and     PCP :  Dar Hayden MD    [address] / [place] [facility] [address]      HISTORY OF PRESENT ILLNESS:      The patient is a 59 y.o. female presents to the [facility] emergency room sent in by dr Analy Henley from the office. Patient apparently was treated previously with doxycycline for URI symptoms. This did not really help. Patient was also noted to be hypoxic. In the emergency room she was noted to have pericardial effusion as well as bilateral pleural effusions. She does not have any history of rheumatologic disease or cancer. She was then not transferred over to our institution for pericardiocentesis. Family members by the bedside giving history as well.     Past Medical History:        Diagnosis Date    Hyperlipidemia     Osteoporosis      Past Surgical History:        Procedure Laterality Date    CYST REMOVAL      L leg    LEG SURGERY      fx repair    TONSILLECTOMY           Medications Prior to Admission:    Medications Prior to Admission: Alendronate Sodium (FOSAMAX PO), Take 70 mg by mouth   NONFORMULARY, Cholesterol med-does not know the name  doxycycline hyclate (VIBRAMYCIN) 100 MG capsule, Take 100 mg by mouth 2 times daily  rosuvastatin (CRESTOR) 20 MG tablet, Take 20 mg by mouth daily  ketotifen (ZADITOR) 0.025 % ophthalmic solution, Place 1 drop into both eyes 2 times daily  Cholecalciferol (VITAMIN D3) 5000 units TABS, Take 1 tablet by mouth daily  calcium carbonate-vitamin D (RA CALCIUM PLUS VITAMIN D) 600-200 MG-UNIT TABS, Take 1 tablet by mouth 2 times daily    Allergies:  Penicillins    Social History:   Social History     Socioeconomic History    Marital status:      Spouse name: Not on file    Number of children: Not on file    Years of education: Not on file    Highest education level: Not on file   Occupational History    Not on file   Social Needs    Financial resource strain: Not on file    Food insecurity:     Worry: Not on file     Inability: Not on file    Transportation needs:     Medical: Not on file     Non-medical: Not on file   Tobacco Use    Smoking status: Never Smoker    Smokeless tobacco: Never Used   Substance and Sexual Activity    Alcohol use: Yes     Comment: special occasions    Drug use: No    Sexual activity: Not on file   Lifestyle    Physical activity:     Days per week: Not on file     Minutes per session: Not on file    Stress: Not on file   Relationships    Social connections:     Talks on phone: Not on file     Gets together: Not on file     Attends Jew service: Not on file     Active member of club or organization: Not on file     Attends meetings of clubs or organizations: Not on file     Relationship status: Not on file    Intimate partner violence:     Fear of current or ex partner: Not on file     Emotionally abused: Not on file     Physically abused: Not on file     Forced sexual activity: Not on file   Other Topics Concern    Not on file   Social History Narrative    Not on file         Family History:       Problem Relation Age of Onset    Diabetes Father        REVIEW OF SYSTEMS:    General ROS: Positive for chills, weakness  Hematological and Lymphatic ROS: negative  Endocrine ROS: negative  Respiratory ROS: Positive for dyspnea on exertion Cardiovascular ROS: no chest pain or dyspnea on exertion  Gastrointestinal ROS: no abdominal pain, change in bowel habits, or black or bloody stools  Genito-Urinary ROS: no dysuria, trouble voiding, or hematuria  Neurological ROS: no TIA or stroke symptoms  negative    Vitals:  /78   Pulse 96   Temp 98.8 °F (37.1 °C) (Temporal)   Resp (!) 39   Ht 4' 11\" (1.499 m)   Wt 128 lb 4.9 oz (58.2 kg)   SpO2 98%   BMI 25.91 kg/m²     PHYSICAL EXAM:  General:  Awake, alert, oriented X 3.   Well developed, well nourished, well groomed. No apparent distress. HEENT:  Normocephalic, atraumatic. Pupils equal, round, reactive to light. No scleral icterus. No conjunctival injection. Neck:  Supple, no carotid bruits  Heart:  Regular tachycardia  Lungs:  CTA bilaterally, decreased breath sounds bases  Abdomen:   Bowel sounds present, soft, nontender, no masses, no organomegaly, no peritoneal signs  Extremities:  No clubbing, cyanosis, or edema  Skin:  Warm and dry, no open lesions or rash  Neuro:  Cranial nerves 2-12 intact, no focal deficits      DATA:     Recent Results (from the past 24 hour(s))   EKG 12 Lead    Collection Time: 04/06/19  4:13 PM   Result Value Ref Range    Ventricular Rate 110 BPM    Atrial Rate 110 BPM    P-R Interval 120 ms    QRS Duration 82 ms    Q-T Interval 342 ms    QTc Calculation (Bazett) 462 ms    P Axis 35 degrees    R Axis 23 degrees    T Axis 32 degrees   Sedimentation Rate    Collection Time: 04/06/19  5:10 PM   Result Value Ref Range    Sed Rate 130 (H) 0 - 20 mm/Hr   C-reactive protein    Collection Time: 04/06/19  5:10 PM   Result Value Ref Range    CRP 24.5 (H) 0.0 - 0.4 mg/dL   CBC auto differential    Collection Time: 04/07/19  5:50 AM   Result Value Ref Range    WBC 9.0 4.5 - 11.5 E9/L    RBC 3.31 (L) 3.50 - 5.50 E12/L    Hemoglobin 9.5 (L) 11.5 - 15.5 g/dL    Hematocrit 30.3 (L) 34.0 - 48.0 %    MCV 91.5 80.0 - 99.9 fL    MCH 28.7 26.0 - 35.0 pg    MCHC 31.4 (L) 32.0 - 34.5 %    RDW 13.3 11.5 - 15.0 fL    Platelets 868 (H) 372 - 450 E9/L    MPV 8.7 7.0 - 12.0 fL    Neutrophils % 68.3 43.0 - 80.0 %    Immature Granulocytes % 0.4 0.0 - 5.0 %    Lymphocytes % 21.9 20.0 - 42.0 %    Monocytes % 9.0 2.0 - 12.0 %    Eosinophils % 0.2 0.0 - 6.0 %    Basophils % 0.2 0.0 - 2.0 %    Neutrophils # 6.16 1.80 - 7.30 E9/L    Immature Granulocytes # 0.04 E9/L    Lymphocytes # 1.98 1.50 - 4.00 E9/L    Monocytes # 0.81 0.10 - 0.95 E9/L    Eosinophils # 0.02 (L) 0.05 - 0.50 E9/L

## 2019-04-07 NOTE — ANESTHESIA PRE PROCEDURE
MAGNESIA) 400 MG/5ML suspension 30 mL  30 mL Oral Daily PRN Georgina Reynaga MD        ondansetron (ZOFRAN) injection 4 mg  4 mg Intravenous Q6H PRN Georgina Reynaga MD        pantoprazole (PROTONIX) tablet 40 mg  40 mg Oral QAM AC Giles Cruz MD   Stopped at 04/07/19 0729       Allergies: Allergies   Allergen Reactions    Penicillins Rash       Problem List:    Patient Active Problem List   Diagnosis Code    Chest pain R07.9    Pericardial effusion I31.3       Past Medical History:        Diagnosis Date    Hyperlipidemia     Osteoporosis        Past Surgical History:        Procedure Laterality Date    CYST REMOVAL      L leg    LEG SURGERY      fx repair    TONSILLECTOMY         Social History:    Social History     Tobacco Use    Smoking status: Never Smoker    Smokeless tobacco: Never Used   Substance Use Topics    Alcohol use: Yes     Comment: special occasions                                Counseling given: Not Answered      Vital Signs (Current):   Vitals:    04/07/19 0900 04/07/19 1000 04/07/19 1100 04/07/19 1200   BP: 115/72 111/69 128/72 123/78   Pulse: 108 104 103 96   Resp: 26 29 (!) 39 (!) 39   Temp:    37.1 °C (98.8 °F)   TempSrc:    Temporal   SpO2: 98% 96% 98% 98%   Weight:       Height:                                                  BP Readings from Last 3 Encounters:   04/07/19 123/78   04/06/19 133/63       NPO Status:  Patient and RN instructed to remain NPO past midnight 4/7/2019                                                                               BMI:   Wt Readings from Last 3 Encounters:   04/07/19 128 lb 4.9 oz (58.2 kg)   04/06/19 120 lb (54.4 kg)     Body mass index is 25.91 kg/m².     CBC:   Lab Results   Component Value Date    WBC 9.0 04/07/2019    RBC 3.31 04/07/2019    HGB 9.5 04/07/2019    HCT 30.3 04/07/2019    MCV 91.5 04/07/2019    RDW 13.3 04/07/2019     04/07/2019       CMP:   Lab Results   Component Value Date     04/07/2019    K 3.6 04/07/2019     04/07/2019    CO2 18 04/07/2019    BUN 11 04/07/2019    CREATININE 0.5 04/07/2019    GFRAA >60 04/07/2019    LABGLOM >60 04/07/2019    GLUCOSE 113 04/07/2019    PROT 7.1 04/07/2019    CALCIUM 8.1 04/07/2019    BILITOT 0.4 04/07/2019    ALKPHOS 170 04/07/2019    AST 90 04/07/2019     04/07/2019       POC Tests: No results for input(s): POCGLU, POCNA, POCK, POCCL, POCBUN, POCHEMO, POCHCT in the last 72 hours. Coags:   Lab Results   Component Value Date    PROTIME 16.4 04/06/2019    INR 1.5 04/06/2019    APTT 26.0 04/06/2019       HCG (If Applicable): No results found for: PREGTESTUR, PREGSERUM, HCG, HCGQUANT     ABGs: No results found for: PHART, PO2ART, KEU1NGG, NAP8YBQ, BEART, C0UJFAGR     Type & Screen (If Applicable):  No results found for: LABABO, LABRH     EKG 4/6/2019  Narrative   Sinus tachycardia  Cannot rule out Anterior infarct , age undetermined  Abnormal ECG  No previous ECGs available     ECHO 4/6/2019   Findings      Left Ventricle   Left ventricular size is normal.   Normal LV segmental wall motion, EF 60%.   Normal LV diastolic function.      Right Ventricle   Normal right ventricle structure and function.      Left Atrium   Left atrium is of normal size. Interatrial septum not well visualized but   appears intact.      Right Atrium   Normal right atrium.      Mitral Valve   Normal mitral valve structure and function. No mitral valve prolapse.      Tricuspid Valve   Normal tricuspid valve structure.   There is trace tricuspid regurgitation.   Mild pulmonary hypertension, RVSP 40mmHg.      Aortic Valve   Normal aortic valve structure and function.      Pulmonic Valve   The pulmonic valve was not well visualized.      Pericardial Effusion   Moderate to large circumferential pericardial effusion (1.5cm -   1.8cm)without significant tamponade.  (No RV free wall or right atrial wall   diastolic collapse; IVC not dilated)   Pericardium appears normal.      Pleural Effusion   No evidence of pleural effusion.      Aorta   Normal aortic root and ascending aorta.   Miscellaneous   The inferior vena cava diameter is normal with normal respiratory   variation.      Conclusions      Summary   Moderate to large circumferential pericardial effusion (1.5cm - 1.8cm),   without significant tamponade (no RV free wall or right atrial wall   diastolic collapse; IVC not dilated)   Left ventricular size is normal.   Normal LV segmental wall motion, EF 60%.   Left atrium is of normal size. Interatrial septum not well visualized but   appears intact.   Normal right ventricle structure and function.   Normal right atrium.   Normal mitral valve structure and function.   No mitral valve prolapse.   There is trace tricuspid regurgitation.   Mild pulmonary hypertension, RVSP 40mmHg.   The pulmonic valve was not well visualized.   Normal aortic root and ascending aorta.   Small echogenic masses noted in the pericardial space.   No intracardiac mass.   No recent study to compare. CT  Chest 4/6/2019  Impression       No evidence for pulmonary embolism or aortic dissection.       Large pericardial effusion measuring up to 2.7 cm in greatest   thickness/cross section.       Small right and moderate left pleural effusions with contiguous   atelectasis. Chest Xray 4/7/2019  Findings:       The lungs are clear.  There is no evidence of pulmonary infiltrate or   pleural effusion.  The pulmonary vascularity is unremarkable. There is   cardiomegaly with left ventricular contour. CT of the chest   demonstrate there is a pericardial effusion which cannot be identified   in the chest radiograph. .  The bony thorax demonstrates no gross   abnormality.          Anesthesia Evaluation  Patient summary reviewed and Nursing notes reviewed no history of anesthetic complications:   Airway: Mallampati: II  TM distance: >3 FB   Neck ROM: full  Mouth opening: > = 3 FB Dental: normal exam     Comment: Patient denies any chipped, cracked, loose teeth    Pulmonary:Negative Pulmonary ROS and normal exam  breath sounds clear to auscultation                            ROS comment: Currently on 2L NC  Required BiPap 3/6/19 through night   Cardiovascular:    (+) hyperlipidemia      ECG reviewed  Rhythm: regular  Rate: normal  Echocardiogram reviewed         Beta Blocker:  Not on Beta Blocker      ROS comment: Pericardial effusion- to OR 4/8/2019 w/ Dr. Crys Nettles for pericardiocentesis possible pericardial window     Neuro/Psych:   Negative Neuro/Psych ROS              GI/Hepatic/Renal: Neg GI/Hepatic/Renal ROS            Endo/Other:                      ROS comment: Osteoporosis Abdominal:           Vascular: negative vascular ROS. Anesthesia Plan      general     ASA 4     (R AC 20)  Induction: intravenous. arterial line, central line and JV  MIPS: Prophylactic antiemetics administered and Postoperative trial extubation. Anesthetic plan and risks discussed with patient. Use of blood products discussed with patient whom consented to blood products. Plan discussed with CRNA and attending.                 Pedro Moore RN   4/7/2019

## 2019-04-07 NOTE — PLAN OF CARE
Problem: Risk for Impaired Skin Integrity  Goal: Tissue integrity - skin and mucous membranes  Description  Structural intactness and normal physiological function of skin and  mucous membranes.   Outcome: Met This Shift     Problem: Falls - Risk of:  Goal: Will remain free from falls  Description  Will remain free from falls  Outcome: Met This Shift     Problem: Falls - Risk of:  Goal: Absence of physical injury  Description  Absence of physical injury  Outcome: Met This Shift

## 2019-04-07 NOTE — PROGRESS NOTES
[address] [facility]  Department of Internal Medicine   Internal Medicine Residency   MICU Progress Note    Patient:  Talia Mcdonald 59 y.o. female  MRN: [id number]     Date of Service: 4/7/2019    Allergy: Penicillins    Subjective     Patient is a 58 yo female with PMH of Hyperlipidemia and Osteoporosis here in the ICU for further management of pericardial effusion. When seen this morning, patient is a alert and able to answer questions appropriately.        Objective     VS: /76   Pulse 101   Temp 98 °F (36.7 °C)   Resp 27   Ht 4' 11\" (1.499 m)   Wt 128 lb 4.9 oz (58.2 kg)   SpO2 98%   BMI 25.91 kg/m²           I & O - 24hr:     Intake/Output Summary (Last 24 hours) at 4/7/2019 1246  Last data filed at 4/7/2019 0600  Gross per 24 hour   Intake 540 ml   Output 250 ml   Net 290 ml       Physical Exam:  · General Appearance: alert, appears stated age, cooperative   · Lung: clear to auscultation bilaterally   · Heart: regular rate and rhythm without murmurs   · Abdomen: soft, non tender, non distended, bowel sounds present   · Extremities:  Mild edema in lower extremities bilaterally   · Neurologic: alert and oriented x3     Medications       Current Facility-Administered Medications:     ceFAZolin (ANCEF) 2 g in dextrose 3 % 50 mL IVPB (duplex), 2 g, Intravenous, See Admin Instructions, Tova Mahmood PA-C    ketotifen (ZADITOR) 0.025 % ophthalmic solution 1 drop, 1 drop, Both Eyes, BID, Lucas Manzo MD, 1 drop at 04/07/19 0820    rosuvastatin (CRESTOR) tablet 20 mg, 20 mg, Oral, Daily, Lucas Manzo MD, 20 mg at 04/06/19 2029    sodium chloride flush 0.9 % injection 10 mL, 10 mL, Intravenous, 2 times per day, Georgina Reynaga MD    sodium chloride flush 0.9 % injection 10 mL, 10 mL, Intravenous, PRN, Georgina Reynaga MD    magnesium hydroxide (MILK OF MAGNESIA) 400 MG/5ML suspension 30 mL, 30 mL, Oral, Daily PRN, Georgina Reynaga MD    ondansetron (ZOFRAN) injection 4 mg, 4 mg, Intravenous, Q6H PRN, Georgina Reynaga MD    pantoprazole (PROTONIX) tablet 40 mg, 40 mg, Oral, QAM AC, Georgina Reynaga MD, Stopped at 04/07/19 3204      Patient currently has   Urinary cath  Isolation  Restraints  DVT prophylaxis/ GI prophylaxis,    PT/OT  SNF/NH placement  Palliative care consult   Labs     Results for Paula Avelar (MRN [id number]) as of 4/7/2019 08:23   Ref.  Range 4/7/2019 05:50   Sodium Latest Ref Range: 132 - 146 mmol/L 141   Potassium Latest Ref Range: 3.5 - 5.0 mmol/L 3.6   Chloride Latest Ref Range: 98 - 107 mmol/L 108 (H)   CO2 Latest Ref Range: 22 - 29 mmol/L 18 (L)   BUN Latest Ref Range: 8 - 23 mg/dL 11   Creatinine Latest Ref Range: 0.5 - 1.0 mg/dL 0.5   Anion Gap Latest Ref Range: 7 - 16 mmol/L 15   GFR Non- Latest Ref Range: >=60 mL/min/1.73 >60   GFR African American Unknown >60   Glucose Latest Ref Range: 74 - 99 mg/dL 113 (H)   Calcium Latest Ref Range: 8.6 - 10.2 mg/dL 8.1 (L)   Total Protein Latest Ref Range: 6.4 - 8.3 g/dL 7.1   Albumin Latest Ref Range: 3.5 - 5.2 g/dL 3.1 (L)   Alk Phos Latest Ref Range: 35 - 104 U/L 170 (H)   ALT Latest Ref Range: 0 - 32 U/L 155 (H)   AST Latest Ref Range: 0 - 31 U/L 90 (H)   Bilirubin Latest Ref Range: 0.0 - 1.2 mg/dL 0.4   Bilirubin, Direct Latest Ref Range: 0.0 - 0.3 mg/dL <0.2   Bilirubin, Indirect Latest Ref Range: 0.0 - 1.0 mg/dL see below   TSH Latest Ref Range: 0.270 - 4.200 uIU/mL 3.010   WBC Latest Ref Range: 4.5 - 11.5 E9/L 9.0   RBC Latest Ref Range: 3.50 - 5.50 E12/L 3.31 (L)   Hemoglobin Quant Latest Ref Range: 11.5 - 15.5 g/dL 9.5 (L)   Hematocrit Latest Ref Range: 34.0 - 48.0 % 30.3 (L)   MCV Latest Ref Range: 80.0 - 99.9 fL 91.5   MCH Latest Ref Range: 26.0 - 35.0 pg 28.7   MCHC Latest Ref Range: 32.0 - 34.5 % 31.4 (L)   MPV Latest Ref Range: 7.0 - 12.0 fL 8.7   RDW Latest Ref Range: 11.5 - 15.0 fL 13.3   Platelet Count Latest Ref Range: 130 - 450 E9/L 492 (H)   Neutrophils % Latest Ref Range: 43.0 - 80.0 % 68.3   Immature Granulocytes % Latest Ref Range: 0.0 - 5.0 % 0.4   Lymphocyte % Latest Ref Range: 20.0 - 42.0 % 21.9   Monocytes % Latest Ref Range: 2.0 - 12.0 % 9.0   Eosinophils % Latest Ref Range: 0.0 - 6.0 % 0.2   Basophils % Latest Ref Range: 0.0 - 2.0 % 0.2   Neutrophils # Latest Ref Range: 1.80 - 7.30 E9/L 6.16   Immature Granulocytes # Latest Units: E9/L 0.04   Lymphocytes # Latest Ref Range: 1.50 - 4.00 E9/L 1.98   Monocytes # Latest Ref Range: 0.10 - 0.95 E9/L 0.81   Eosinophils # Latest Ref Range: 0.05 - 0.50 E9/L 0.02 (L)   Basophils # Latest Ref Range: 0.00 - 0.20 E9/L 0.02   Ferritin Latest Units: ng/mL 1,085   Iron Latest Ref Range: 37 - 145 mcg/dL 16 (L)   Iron Saturation Latest Ref Range: 15 - 50 % 10 (L)   TIBC Latest Ref Range: 250 - 450 mcg/dL 168 (L)   Folate Latest Ref Range: 4.8 - 24.2 ng/mL 15.7   Vitamin B-12 Latest Ref Range: 211 - 946 pg/mL 1433 (H)       Imaging Studies:  CXR:   Findings compatible with left pleural effusion and associated   atelectasis and/or airspace consolidation in the left lower lung. Cardiomegaly. CT scan chest:  No evidence for pulmonary embolism or aortic dissection.       Large pericardial effusion measuring up to 2.7 cm in greatest   thickness/cross section.       Small right and moderate left pleural effusions with contiguous   atelectasis. Medical Student's Assessment and Plan     Patient is a 60 yo female with PMH of Hyperlipidemia and Osteoporosis here in the ICU for further management of pericardial effusion. Plan:  -Need to determine underlying etiology of patient's condition and thus will require further evaluation.  Possible etiologies include infectious causes, auto immune processes, and idiopathic cause   -Will evaluate these causes by following up on labs: Blood cultures, RA, Anti DS DNA, Age appropriate cancer screening  -Will perform US of lower extremities today 4/7  -Cardiothoracic came and evaluated patient, will drain 4/8 at 7AM  -Will follow up on fluid cytology  -Continue to monitor patient and follow up on labs     Angel Mathis, MS-4

## 2019-04-07 NOTE — PROGRESS NOTES
[address] [facility]   Department of Internal Medicine   Internal Medicine Residency  MICU Progress Note    Patient:  Sabrina Kaur 59 y.o. female   MRN: [id number]       Date of Service: 4/7/2019    Allergy: Penicillins    Subjective     Pt seen, met lying in bed. Has no complaints this morning. Admits to feeling pretty much the same. Currently on Bi-pap and saturation at 97%. Seen by CTS and planning for pericardiocentesis tomorrow morning. Will follow-up outstanding labs for evaluation of etiology of effusion. Objective     Vitals:    04/07/19 0500 04/07/19 0551 04/07/19 0600 04/07/19 0700   BP: 117/78  105/74 99/72   Pulse: 112  105 96   Resp: (!) 38 29 28 20   Temp:   98 °F (36.7 °C)    TempSrc:       SpO2: 96% 97% 96% 97%   Weight:   128 lb 4.9 oz (58.2 kg)    Height:           Physical Exam:  · I & O - 24hr:No intake/output data recorded. Physical Exam   Constitutional: She is oriented to person, place, and time. She appears well-developed and well-nourished. No distress. HENT:   Head: Normocephalic and atraumatic. Eyes: Pupils are equal, round, and reactive to light. Conjunctivae are normal. No scleral icterus. Cardiovascular: Regular rhythm, S1 normal, S2 normal, normal heart sounds and intact distal pulses. No murmur heard. tachycardic   Pulmonary/Chest: Effort normal. No respiratory distress. She has no wheezes. She has no rales. Currently on Bipap  Still with reduced bs on left lung field and right lower lung field   Abdominal: Soft. Bowel sounds are normal. She exhibits no distension and no mass. There is no tenderness. There is no guarding. Musculoskeletal: She exhibits edema and tenderness. Mild edema and tenderness in lower extremities, R>L   Neurological: She is alert and oriented to person, place, and time. No cranial nerve deficit. Skin: Skin is warm and dry. She is not diaphoretic.          Medications     Continuous Infusions:  Scheduled Meds:   ketotifen  1 drop Both Eyes BID    rosuvastatin  20 mg Oral Daily    sodium chloride flush  10 mL Intravenous 2 times per day    pantoprazole  40 mg Oral QAM AC     PRN Meds: sodium chloride flush, magnesium hydroxide, ondansetron  Nutrition:       Labs and Imaging Studies     CBC:   Recent Labs     04/06/19  1156 04/07/19  0550   WBC 11.3 9.0   RBC 3.01* 3.31*   HGB 8.9* 9.5*   HCT 27.8* 30.3*   MCV 92.4 91.5   MCH 29.6 28.7   MCHC 32.0 31.4*   RDW 13.3 13.3   * 492*   MPV 8.7 8.7       BMP:    Recent Labs     04/06/19  1156 04/07/19  0550    141   K 3.9 3.6    108*   CO2 23 18*   BUN 12 11   CREATININE 0.5 0.5   GLUCOSE 112* 113*   CALCIUM 8.6 8.1*   PROT  --  7.1   LABALBU  --  3.1*   BILITOT  --  0.4   ALKPHOS  --  170*   AST  --  90*   ALT  --  155*       LIVER PROFILE:   Recent Labs     04/07/19  0550   AST 90*   *   BILIDIR <0.2   BILITOT 0.4   ALKPHOS 170*       PT/INR:   Recent Labs     04/06/19  1156   PROTIME 16.4*   INR 1.5       APTT:   Recent Labs     04/06/19  1156   APTT 26.0       Fasting Lipid Panel:    No results found for: CHOL, TRIG, HDL    Cardiac Enzymes:    Lab Results   Component Value Date    CKTOTAL 75 07/03/2015    CKMB 2.0 07/03/2015    TROPONINI <0.01 04/06/2019    TROPONINI <0.01 07/03/2015    TROPONINI <0.01 07/03/2015       Notable Cultures:      Blood cultures No results found for: BC  Respiratory cultures No results found for: RESPCULTURE No results found for: LABGRAM  Urine No results found for: LABURIN  Legionella No results found for: LABLEGI  C Diff PCR No results found for: CDIFPCR  Wound culture/abscess: No results for input(s): WNDABS in the last 72 hours. Tip culture:No results for input(s): CXCATHTIP in the last 72 hours.     Resident's Assessment and PLan     Cardiovascular  Pericardial effusion  - likely 2/2 pericarditis, will continue evaluate for etiology  - no obvious evidence of cardiac tamponade or hemodynamic compromise  - CT chest showing large pericardial effusion 2.7 cm, Echo done showed EF 60% effusion 1.5 cm - 1.8cm without tamponade(no RV free wall or right atrial wall diastolic collapse, mild PHTN 40 mmHg. - , CRP 24.5, troponin negative  - EKG not showing low voltage or pulsus alternans  - CTS consulted for possible pericardiocentesis tomorrow at 7:00 am- started on albumin  - obtain SHRADDHA, HIV, Hepatitis panel, IgG, IgM, COXSACKIE procalcitonin, pan culture, Anti DS DNA, RF, viral panel-negative,TSH-3  - consider JV for further evaluation  - for incentive spirometry  - monitor vital signs closely    Gastroenterology  Transaminitis  - AST 90//  - follow-up hepatitis panel    Hematology  Normocytic anemia  - likely 2/2 AOCD  - Hb 8.9 on admission, today 9.5  - Fe 16/TIBC 163/Ferritin 1000  - obtain FOBT  - monitor CBC daily        DVT/GI prophylaxis: PCD/Protonix  [facility]        Yazmin Watson M.D. , PGY-1  Internal Medicine     Attending Physician: Dr. Jennyfer Juares ICU Attending Statement     Arbie Hodgkin was seen, examined and discussed with the multi-disciplinary ICU team during rounds. A addendum note may be separate to the residents note. If not then, I have personally seen and examined the patient and the key elements of the encounter were performed by me (> 85 % time). The medications & laboratory data was discussed and adjusted where necessary. The radiographic images were reviewed either as a group or with radiologist.  Any changes are document or changed if felt dis-concordant with the exam or history. The above findings were corroborated, plans confirmed and changes made if needed. Family was updated at the bedside as available. Key issues of the case were discussed among consultants. Critical Care time is documented if appropriate.       Yg Teresa DO, MPH  Professor of Medicine

## 2019-04-07 NOTE — PATIENT CARE CONFERENCE
P Quality Flow/Interdisciplinary Rounds Progress Note        Quality Flow Rounds held on April 7, 2019    Disciplines Attending:  ICU rounding team, resp therapy, bedside nurse, charge nurse    Juana Ddoge was admitted on 4/6/2019  4:55 PM    Anticipated Discharge Date:  Expected Discharge Date: 04/09/19    Disposition:    Wally Score:  Wally Scale Score: 21    Readmission Risk              Risk of Unplanned Readmission:        10           Discussed patient goal for the day, patient clinical progression, and barriers to discharge. The following Goal(s) of the Day/Commitment(s) have been identified: For pericardial window Monday.       Marcelino Pac  April 7, 2019

## 2019-04-08 ENCOUNTER — ANESTHESIA (OUTPATIENT)
Dept: OPERATING ROOM | Age: 64
DRG: 315 | End: 2019-04-08
Payer: COMMERCIAL

## 2019-04-08 ENCOUNTER — APPOINTMENT (OUTPATIENT)
Dept: GENERAL RADIOLOGY | Age: 64
DRG: 315 | End: 2019-04-08
Attending: INTERNAL MEDICINE
Payer: COMMERCIAL

## 2019-04-08 VITALS
DIASTOLIC BLOOD PRESSURE: 64 MMHG | RESPIRATION RATE: 7 BRPM | OXYGEN SATURATION: 94 % | SYSTOLIC BLOOD PRESSURE: 142 MMHG

## 2019-04-08 LAB
ADDENDUM ELECTROPHORESIS URINE RANDOM: NORMAL
ALBUMIN SERPL-MCNC: 2.4 G/DL (ref 3.5–4.7)
ALPHA-1-GLOBULIN: 0.5 G/DL (ref 0.2–0.4)
ALPHA-2-GLOBULIN: 1.3 G/FL (ref 0.5–1)
ANION GAP SERPL CALCULATED.3IONS-SCNC: 12 MMOL/L (ref 7–16)
ANION GAP SERPL CALCULATED.3IONS-SCNC: 13 MMOL/L (ref 7–16)
ANTI DNA DOUBLE STRANDED: NEGATIVE
ANTI-NUCLEAR ANTIBODY (ANA): NEGATIVE
BASOPHILS ABSOLUTE: 0.03 E9/L (ref 0–0.2)
BASOPHILS RELATIVE PERCENT: 0.3 % (ref 0–2)
BETA GLOBULIN: 0.9 G/DL (ref 0.8–1.3)
BUN BLDV-MCNC: 11 MG/DL (ref 8–23)
BUN BLDV-MCNC: 12 MG/DL (ref 8–23)
CALCIUM SERPL-MCNC: 7.5 MG/DL (ref 8.6–10.2)
CALCIUM SERPL-MCNC: 8.1 MG/DL (ref 8.6–10.2)
CHLORIDE BLD-SCNC: 108 MMOL/L (ref 98–107)
CHLORIDE BLD-SCNC: 113 MMOL/L (ref 98–107)
CO2: 19 MMOL/L (ref 22–29)
CO2: 21 MMOL/L (ref 22–29)
CREAT SERPL-MCNC: 0.4 MG/DL (ref 0.5–1)
CREAT SERPL-MCNC: 0.5 MG/DL (ref 0.5–1)
CREATININE URINE: 194 MG/DL (ref 29–226)
ELECTROPHORESIS: ABNORMAL
EOSINOPHILS ABSOLUTE: 0.07 E9/L (ref 0.05–0.5)
EOSINOPHILS RELATIVE PERCENT: 0.7 % (ref 0–6)
GAMMA GLOBULIN: 0.9 G/DL (ref 0.7–1.6)
GFR AFRICAN AMERICAN: >60
GFR AFRICAN AMERICAN: >60
GFR NON-AFRICAN AMERICAN: >60 ML/MIN/1.73
GFR NON-AFRICAN AMERICAN: >60 ML/MIN/1.73
GLUCOSE BLD-MCNC: 115 MG/DL (ref 74–99)
GLUCOSE BLD-MCNC: 155 MG/DL (ref 74–99)
HAV IGM SER IA-ACNC: NORMAL
HCT VFR BLD CALC: 27.7 % (ref 34–48)
HCT VFR BLD CALC: 28.4 % (ref 34–48)
HEMOGLOBIN: 8.6 G/DL (ref 11.5–15.5)
HEMOGLOBIN: 8.9 G/DL (ref 11.5–15.5)
HEPATITIS B CORE IGM ANTIBODY: NORMAL
HEPATITIS B SURFACE ANTIGEN INTERPRETATION: NORMAL
HEPATITIS C ANTIBODY INTERPRETATION: NORMAL
HIV-1 AND HIV-2 ANTIBODIES: NORMAL
IGG: 751 MG/DL (ref 700–1600)
IGM: 44 MG/DL (ref 40–230)
IMMATURE GRANULOCYTES #: 0.05 E9/L
IMMATURE GRANULOCYTES %: 0.5 % (ref 0–5)
LYMPHOCYTES ABSOLUTE: 1.78 E9/L (ref 1.5–4)
LYMPHOCYTES RELATIVE PERCENT: 17.5 % (ref 20–42)
MAGNESIUM: 2.2 MG/DL (ref 1.6–2.6)
MAGNESIUM: 2.3 MG/DL (ref 1.6–2.6)
MCH RBC QN AUTO: 27.9 PG (ref 26–35)
MCH RBC QN AUTO: 28.5 PG (ref 26–35)
MCHC RBC AUTO-ENTMCNC: 31 % (ref 32–34.5)
MCHC RBC AUTO-ENTMCNC: 31.3 % (ref 32–34.5)
MCV RBC AUTO: 89 FL (ref 80–99.9)
MCV RBC AUTO: 91.7 FL (ref 80–99.9)
MONOCYTES ABSOLUTE: 0.7 E9/L (ref 0.1–0.95)
MONOCYTES RELATIVE PERCENT: 6.9 % (ref 2–12)
NEUTROPHILS ABSOLUTE: 7.53 E9/L (ref 1.8–7.3)
NEUTROPHILS RELATIVE PERCENT: 74.1 % (ref 43–80)
PDW BLD-RTO: 13.3 FL (ref 11.5–15)
PDW BLD-RTO: 13.5 FL (ref 11.5–15)
PLATELET # BLD: 464 E9/L (ref 130–450)
PLATELET # BLD: 467 E9/L (ref 130–450)
PMV BLD AUTO: 8.6 FL (ref 7–12)
PMV BLD AUTO: 8.9 FL (ref 7–12)
POTASSIUM REFLEX MAGNESIUM: 3.4 MMOL/L (ref 3.5–5)
POTASSIUM SERPL-SCNC: 3.3 MMOL/L (ref 3.5–5)
PROTEIN PROTEIN: 89 MG/DL (ref 0–12)
PROTEIN/CREAT RATIO: 0.5
PROTEIN/CREAT RATIO: 0.5 (ref 0–0.2)
RBC # BLD: 3.02 E12/L (ref 3.5–5.5)
RBC # BLD: 3.19 E12/L (ref 3.5–5.5)
REASON FOR REJECTION: NORMAL
REJECTED TEST: NORMAL
SODIUM BLD-SCNC: 142 MMOL/L (ref 132–146)
SODIUM BLD-SCNC: 144 MMOL/L (ref 132–146)
TOTAL PROTEIN: 6 G/DL (ref 6.4–8.3)
WBC # BLD: 10.2 E9/L (ref 4.5–11.5)
WBC # BLD: 11.1 E9/L (ref 4.5–11.5)

## 2019-04-08 PROCEDURE — 6360000002 HC RX W HCPCS: Performed by: STUDENT IN AN ORGANIZED HEALTH CARE EDUCATION/TRAINING PROGRAM

## 2019-04-08 PROCEDURE — 85027 COMPLETE CBC AUTOMATED: CPT

## 2019-04-08 PROCEDURE — 87205 SMEAR GRAM STAIN: CPT

## 2019-04-08 PROCEDURE — 87015 SPECIMEN INFECT AGNT CONCNTJ: CPT

## 2019-04-08 PROCEDURE — 3700000001 HC ADD 15 MINUTES (ANESTHESIA): Performed by: THORACIC SURGERY (CARDIOTHORACIC VASCULAR SURGERY)

## 2019-04-08 PROCEDURE — 3600000004 HC SURGERY LEVEL 4 BASE: Performed by: THORACIC SURGERY (CARDIOTHORACIC VASCULAR SURGERY)

## 2019-04-08 PROCEDURE — 2580000003 HC RX 258: Performed by: INTERNAL MEDICINE

## 2019-04-08 PROCEDURE — 87116 MYCOBACTERIA CULTURE: CPT

## 2019-04-08 PROCEDURE — 85025 COMPLETE CBC W/AUTO DIFF WBC: CPT

## 2019-04-08 PROCEDURE — 87070 CULTURE OTHR SPECIMN AEROBIC: CPT

## 2019-04-08 PROCEDURE — 84166 PROTEIN E-PHORESIS/URINE/CSF: CPT

## 2019-04-08 PROCEDURE — 6360000002 HC RX W HCPCS: Performed by: THORACIC SURGERY (CARDIOTHORACIC VASCULAR SURGERY)

## 2019-04-08 PROCEDURE — 36415 COLL VENOUS BLD VENIPUNCTURE: CPT

## 2019-04-08 PROCEDURE — 6370000000 HC RX 637 (ALT 250 FOR IP): Performed by: THORACIC SURGERY (CARDIOTHORACIC VASCULAR SURGERY)

## 2019-04-08 PROCEDURE — 80048 BASIC METABOLIC PNL TOTAL CA: CPT

## 2019-04-08 PROCEDURE — 2000000000 HC ICU R&B

## 2019-04-08 PROCEDURE — 71045 X-RAY EXAM CHEST 1 VIEW: CPT

## 2019-04-08 PROCEDURE — 87075 CULTR BACTERIA EXCEPT BLOOD: CPT

## 2019-04-08 PROCEDURE — 87206 SMEAR FLUORESCENT/ACID STAI: CPT

## 2019-04-08 PROCEDURE — 84165 PROTEIN E-PHORESIS SERUM: CPT

## 2019-04-08 PROCEDURE — 2580000003 HC RX 258: Performed by: THORACIC SURGERY (CARDIOTHORACIC VASCULAR SURGERY)

## 2019-04-08 PROCEDURE — 83735 ASSAY OF MAGNESIUM: CPT

## 2019-04-08 PROCEDURE — 3700000000 HC ANESTHESIA ATTENDED CARE: Performed by: THORACIC SURGERY (CARDIOTHORACIC VASCULAR SURGERY)

## 2019-04-08 PROCEDURE — 7100000001 HC PACU RECOVERY - ADDTL 15 MIN: Performed by: THORACIC SURGERY (CARDIOTHORACIC VASCULAR SURGERY)

## 2019-04-08 PROCEDURE — 88237 TISSUE CULTURE BONE MARROW: CPT

## 2019-04-08 PROCEDURE — 88305 TISSUE EXAM BY PATHOLOGIST: CPT

## 2019-04-08 PROCEDURE — 6360000002 HC RX W HCPCS: Performed by: PHYSICIAN ASSISTANT

## 2019-04-08 PROCEDURE — 88112 CYTOPATH CELL ENHANCE TECH: CPT

## 2019-04-08 PROCEDURE — 7100000000 HC PACU RECOVERY - FIRST 15 MIN: Performed by: THORACIC SURGERY (CARDIOTHORACIC VASCULAR SURGERY)

## 2019-04-08 PROCEDURE — 86923 COMPATIBILITY TEST ELECTRIC: CPT

## 2019-04-08 PROCEDURE — 94640 AIRWAY INHALATION TREATMENT: CPT

## 2019-04-08 PROCEDURE — 84156 ASSAY OF PROTEIN URINE: CPT

## 2019-04-08 PROCEDURE — 86658 ENTEROVIRUS ANTIBODY: CPT

## 2019-04-08 PROCEDURE — 82570 ASSAY OF URINE CREATININE: CPT

## 2019-04-08 PROCEDURE — 6360000002 HC RX W HCPCS

## 2019-04-08 PROCEDURE — 86200 CCP ANTIBODY: CPT

## 2019-04-08 PROCEDURE — 2709999900 HC NON-CHARGEABLE SUPPLY: Performed by: THORACIC SURGERY (CARDIOTHORACIC VASCULAR SURGERY)

## 2019-04-08 PROCEDURE — 87102 FUNGUS ISOLATION CULTURE: CPT

## 2019-04-08 PROCEDURE — 82784 ASSAY IGA/IGD/IGG/IGM EACH: CPT

## 2019-04-08 PROCEDURE — 2580000003 HC RX 258

## 2019-04-08 PROCEDURE — 6360000002 HC RX W HCPCS: Performed by: INTERNAL MEDICINE

## 2019-04-08 PROCEDURE — 2500000003 HC RX 250 WO HCPCS

## 2019-04-08 PROCEDURE — 0W9D3ZZ DRAINAGE OF PERICARDIAL CAVITY, PERCUTANEOUS APPROACH: ICD-10-PCS | Performed by: THORACIC SURGERY (CARDIOTHORACIC VASCULAR SURGERY)

## 2019-04-08 PROCEDURE — 6370000000 HC RX 637 (ALT 250 FOR IP): Performed by: STUDENT IN AN ORGANIZED HEALTH CARE EDUCATION/TRAINING PROGRAM

## 2019-04-08 PROCEDURE — 33025 INCISION OF HEART SAC: CPT | Performed by: THORACIC SURGERY (CARDIOTHORACIC VASCULAR SURGERY)

## 2019-04-08 PROCEDURE — 6360000002 HC RX W HCPCS: Performed by: ANESTHESIOLOGY

## 2019-04-08 PROCEDURE — 3600000014 HC SURGERY LEVEL 4 ADDTL 15MIN: Performed by: THORACIC SURGERY (CARDIOTHORACIC VASCULAR SURGERY)

## 2019-04-08 RX ORDER — NAPROXEN 500 MG/1
500 TABLET ORAL 2 TIMES DAILY WITH MEALS
Status: DISCONTINUED | OUTPATIENT
Start: 2019-04-08 | End: 2019-04-10 | Stop reason: HOSPADM

## 2019-04-08 RX ORDER — HYDROCODONE BITARTRATE AND ACETAMINOPHEN 5; 325 MG/1; MG/1
1 TABLET ORAL EVERY 4 HOURS PRN
Status: DISCONTINUED | OUTPATIENT
Start: 2019-04-08 | End: 2019-04-10 | Stop reason: HOSPADM

## 2019-04-08 RX ORDER — SODIUM CHLORIDE 9 MG/ML
INJECTION, SOLUTION INTRAVENOUS CONTINUOUS PRN
Status: DISCONTINUED | OUTPATIENT
Start: 2019-04-08 | End: 2019-04-08 | Stop reason: SDUPTHER

## 2019-04-08 RX ORDER — SODIUM CHLORIDE 0.9 % (FLUSH) 0.9 %
10 SYRINGE (ML) INJECTION EVERY 12 HOURS SCHEDULED
Status: DISCONTINUED | OUTPATIENT
Start: 2019-04-08 | End: 2019-04-10 | Stop reason: HOSPADM

## 2019-04-08 RX ORDER — ONDANSETRON 2 MG/ML
INJECTION INTRAMUSCULAR; INTRAVENOUS PRN
Status: DISCONTINUED | OUTPATIENT
Start: 2019-04-08 | End: 2019-04-08 | Stop reason: SDUPTHER

## 2019-04-08 RX ORDER — LABETALOL HYDROCHLORIDE 5 MG/ML
5 INJECTION, SOLUTION INTRAVENOUS EVERY 10 MIN PRN
Status: DISCONTINUED | OUTPATIENT
Start: 2019-04-08 | End: 2019-04-08 | Stop reason: HOSPADM

## 2019-04-08 RX ORDER — FENTANYL CITRATE 50 UG/ML
INJECTION, SOLUTION INTRAMUSCULAR; INTRAVENOUS PRN
Status: DISCONTINUED | OUTPATIENT
Start: 2019-04-08 | End: 2019-04-08 | Stop reason: SDUPTHER

## 2019-04-08 RX ORDER — POTASSIUM CHLORIDE 20 MEQ/1
20 TABLET, EXTENDED RELEASE ORAL ONCE
Status: COMPLETED | OUTPATIENT
Start: 2019-04-08 | End: 2019-04-08

## 2019-04-08 RX ORDER — SODIUM CHLORIDE 0.9 % (FLUSH) 0.9 %
10 SYRINGE (ML) INJECTION PRN
Status: DISCONTINUED | OUTPATIENT
Start: 2019-04-08 | End: 2019-04-10 | Stop reason: HOSPADM

## 2019-04-08 RX ORDER — DOCUSATE SODIUM 100 MG/1
100 CAPSULE, LIQUID FILLED ORAL 2 TIMES DAILY
Status: DISCONTINUED | OUTPATIENT
Start: 2019-04-08 | End: 2019-04-10 | Stop reason: HOSPADM

## 2019-04-08 RX ORDER — MEPERIDINE HYDROCHLORIDE 50 MG/ML
12.5 INJECTION INTRAMUSCULAR; INTRAVENOUS; SUBCUTANEOUS EVERY 5 MIN PRN
Status: DISCONTINUED | OUTPATIENT
Start: 2019-04-08 | End: 2019-04-08 | Stop reason: HOSPADM

## 2019-04-08 RX ORDER — ROCURONIUM BROMIDE 10 MG/ML
INJECTION, SOLUTION INTRAVENOUS PRN
Status: DISCONTINUED | OUTPATIENT
Start: 2019-04-08 | End: 2019-04-08 | Stop reason: SDUPTHER

## 2019-04-08 RX ORDER — POTASSIUM CHLORIDE 7.45 MG/ML
10 INJECTION INTRAVENOUS
Status: COMPLETED | OUTPATIENT
Start: 2019-04-08 | End: 2019-04-08

## 2019-04-08 RX ORDER — IPRATROPIUM BROMIDE AND ALBUTEROL SULFATE 2.5; .5 MG/3ML; MG/3ML
1 SOLUTION RESPIRATORY (INHALATION)
Status: DISCONTINUED | OUTPATIENT
Start: 2019-04-08 | End: 2019-04-09

## 2019-04-08 RX ORDER — CEFAZOLIN SODIUM 2 G/50ML
2 SOLUTION INTRAVENOUS EVERY 8 HOURS
Status: COMPLETED | OUTPATIENT
Start: 2019-04-08 | End: 2019-04-08

## 2019-04-08 RX ORDER — GLYCOPYRROLATE 1 MG/5 ML
SYRINGE (ML) INTRAVENOUS PRN
Status: DISCONTINUED | OUTPATIENT
Start: 2019-04-08 | End: 2019-04-08 | Stop reason: SDUPTHER

## 2019-04-08 RX ORDER — NEOSTIGMINE METHYLSULFATE 1 MG/ML
INJECTION, SOLUTION INTRAVENOUS PRN
Status: DISCONTINUED | OUTPATIENT
Start: 2019-04-08 | End: 2019-04-08 | Stop reason: SDUPTHER

## 2019-04-08 RX ORDER — FENTANYL CITRATE 50 UG/ML
25 INJECTION, SOLUTION INTRAMUSCULAR; INTRAVENOUS EVERY 5 MIN PRN
Status: DISCONTINUED | OUTPATIENT
Start: 2019-04-08 | End: 2019-04-08 | Stop reason: HOSPADM

## 2019-04-08 RX ORDER — ONDANSETRON 2 MG/ML
4 INJECTION INTRAMUSCULAR; INTRAVENOUS
Status: DISCONTINUED | OUTPATIENT
Start: 2019-04-08 | End: 2019-04-08 | Stop reason: HOSPADM

## 2019-04-08 RX ORDER — MIDAZOLAM HYDROCHLORIDE 1 MG/ML
INJECTION INTRAMUSCULAR; INTRAVENOUS PRN
Status: DISCONTINUED | OUTPATIENT
Start: 2019-04-08 | End: 2019-04-08 | Stop reason: SDUPTHER

## 2019-04-08 RX ORDER — ONDANSETRON 2 MG/ML
4 INJECTION INTRAMUSCULAR; INTRAVENOUS EVERY 6 HOURS PRN
Status: CANCELLED | OUTPATIENT
Start: 2019-04-08

## 2019-04-08 RX ORDER — DIPHENHYDRAMINE HYDROCHLORIDE 50 MG/ML
12.5 INJECTION INTRAMUSCULAR; INTRAVENOUS
Status: DISCONTINUED | OUTPATIENT
Start: 2019-04-08 | End: 2019-04-08 | Stop reason: HOSPADM

## 2019-04-08 RX ORDER — FENTANYL CITRATE 50 UG/ML
50 INJECTION, SOLUTION INTRAMUSCULAR; INTRAVENOUS EVERY 5 MIN PRN
Status: DISCONTINUED | OUTPATIENT
Start: 2019-04-08 | End: 2019-04-08 | Stop reason: HOSPADM

## 2019-04-08 RX ORDER — DEXAMETHASONE SODIUM PHOSPHATE 10 MG/ML
INJECTION INTRAMUSCULAR; INTRAVENOUS PRN
Status: DISCONTINUED | OUTPATIENT
Start: 2019-04-08 | End: 2019-04-08 | Stop reason: SDUPTHER

## 2019-04-08 RX ORDER — PROPOFOL 10 MG/ML
INJECTION, EMULSION INTRAVENOUS PRN
Status: DISCONTINUED | OUTPATIENT
Start: 2019-04-08 | End: 2019-04-08 | Stop reason: SDUPTHER

## 2019-04-08 RX ORDER — HYDROCODONE BITARTRATE AND ACETAMINOPHEN 5; 325 MG/1; MG/1
2 TABLET ORAL EVERY 4 HOURS PRN
Status: DISCONTINUED | OUTPATIENT
Start: 2019-04-08 | End: 2019-04-10 | Stop reason: HOSPADM

## 2019-04-08 RX ORDER — POTASSIUM CHLORIDE 7.45 MG/ML
10 INJECTION INTRAVENOUS
Status: DISCONTINUED | OUTPATIENT
Start: 2019-04-08 | End: 2019-04-08

## 2019-04-08 RX ORDER — ACETAMINOPHEN 325 MG/1
650 TABLET ORAL EVERY 6 HOURS
Status: DISPENSED | OUTPATIENT
Start: 2019-04-08 | End: 2019-04-10

## 2019-04-08 RX ADMIN — POTASSIUM CHLORIDE 10 MEQ: 10 INJECTION, SOLUTION INTRAVENOUS at 17:36

## 2019-04-08 RX ADMIN — PROPOFOL 150 MG: 10 INJECTION, EMULSION INTRAVENOUS at 06:53

## 2019-04-08 RX ADMIN — Medication 10 ML: at 20:12

## 2019-04-08 RX ADMIN — IPRATROPIUM BROMIDE AND ALBUTEROL SULFATE 1 AMPULE: .5; 3 SOLUTION RESPIRATORY (INHALATION) at 16:22

## 2019-04-08 RX ADMIN — DEXAMETHASONE SODIUM PHOSPHATE 10 MG: 10 INJECTION INTRAMUSCULAR; INTRAVENOUS at 06:59

## 2019-04-08 RX ADMIN — SODIUM CHLORIDE: 9 INJECTION, SOLUTION INTRAVENOUS at 06:26

## 2019-04-08 RX ADMIN — FENTANYL CITRATE 50 MCG: 50 INJECTION, SOLUTION INTRAMUSCULAR; INTRAVENOUS at 06:53

## 2019-04-08 RX ADMIN — HYDROCODONE BITARTRATE AND ACETAMINOPHEN 2 TABLET: 5; 325 TABLET ORAL at 11:34

## 2019-04-08 RX ADMIN — ACETAMINOPHEN 650 MG: 325 TABLET, FILM COATED ORAL at 17:36

## 2019-04-08 RX ADMIN — NAPROXEN 500 MG: 500 TABLET ORAL at 17:37

## 2019-04-08 RX ADMIN — Medication 0.2 MG: at 07:23

## 2019-04-08 RX ADMIN — FENTANYL CITRATE 25 MCG: 50 INJECTION, SOLUTION INTRAMUSCULAR; INTRAVENOUS at 07:09

## 2019-04-08 RX ADMIN — Medication 3 MG: at 07:23

## 2019-04-08 RX ADMIN — FENTANYL CITRATE 50 MCG: 50 INJECTION, SOLUTION INTRAMUSCULAR; INTRAVENOUS at 07:41

## 2019-04-08 RX ADMIN — ROCURONIUM BROMIDE 25 MG: 10 INJECTION, SOLUTION INTRAVENOUS at 06:53

## 2019-04-08 RX ADMIN — Medication 10 ML: at 14:04

## 2019-04-08 RX ADMIN — Medication 10 ML: at 10:19

## 2019-04-08 RX ADMIN — DOCUSATE SODIUM 100 MG: 100 CAPSULE, LIQUID FILLED ORAL at 20:12

## 2019-04-08 RX ADMIN — FENTANYL CITRATE 50 MCG: 50 INJECTION, SOLUTION INTRAMUSCULAR; INTRAVENOUS at 07:56

## 2019-04-08 RX ADMIN — CEFAZOLIN SODIUM 2 G: 2 SOLUTION INTRAVENOUS at 06:46

## 2019-04-08 RX ADMIN — IPRATROPIUM BROMIDE AND ALBUTEROL SULFATE 1 AMPULE: .5; 3 SOLUTION RESPIRATORY (INHALATION) at 12:22

## 2019-04-08 RX ADMIN — IPRATROPIUM BROMIDE AND ALBUTEROL SULFATE 1 AMPULE: .5; 3 SOLUTION RESPIRATORY (INHALATION) at 22:00

## 2019-04-08 RX ADMIN — ONDANSETRON HYDROCHLORIDE 4 MG: 2 INJECTION, SOLUTION INTRAMUSCULAR; INTRAVENOUS at 07:14

## 2019-04-08 RX ADMIN — DOCUSATE SODIUM 100 MG: 100 CAPSULE, LIQUID FILLED ORAL at 14:30

## 2019-04-08 RX ADMIN — FENTANYL CITRATE 25 MCG: 50 INJECTION, SOLUTION INTRAMUSCULAR; INTRAVENOUS at 06:35

## 2019-04-08 RX ADMIN — MIDAZOLAM HYDROCHLORIDE 2 MG: 1 INJECTION, SOLUTION INTRAMUSCULAR; INTRAVENOUS at 06:26

## 2019-04-08 RX ADMIN — LIDOCAINE HYDROCHLORIDE 60 MG: 20 INJECTION, SOLUTION INTRAVENOUS at 06:53

## 2019-04-08 RX ADMIN — POTASSIUM CHLORIDE 10 MEQ: 7.46 INJECTION, SOLUTION INTRAVENOUS at 09:40

## 2019-04-08 RX ADMIN — CEFAZOLIN SODIUM 2 G: 2 SOLUTION INTRAVENOUS at 23:17

## 2019-04-08 RX ADMIN — POTASSIUM CHLORIDE 10 MEQ: 10 INJECTION, SOLUTION INTRAVENOUS at 14:45

## 2019-04-08 RX ADMIN — CEFAZOLIN SODIUM 2 G: 2 SOLUTION INTRAVENOUS at 14:35

## 2019-04-08 RX ADMIN — POTASSIUM CHLORIDE 20 MEQ: 20 TABLET, EXTENDED RELEASE ORAL at 14:30

## 2019-04-08 ASSESSMENT — PULMONARY FUNCTION TESTS
PIF_VALUE: 27
PIF_VALUE: 27
PIF_VALUE: 1
PIF_VALUE: 25
PIF_VALUE: 25
PIF_VALUE: 26
PIF_VALUE: 1
PIF_VALUE: 3
PIF_VALUE: 27
PIF_VALUE: 25
PIF_VALUE: 1
PIF_VALUE: 2
PIF_VALUE: 1
PIF_VALUE: 27
PIF_VALUE: 28
PIF_VALUE: 1
PIF_VALUE: 17
PIF_VALUE: 25
PIF_VALUE: 29
PIF_VALUE: 1
PIF_VALUE: 29
PIF_VALUE: 15
PIF_VALUE: 0
PIF_VALUE: 1
PIF_VALUE: 25
PIF_VALUE: 1
PIF_VALUE: 1
PIF_VALUE: 28
PIF_VALUE: 29
PIF_VALUE: 1
PIF_VALUE: 2
PIF_VALUE: 2
PIF_VALUE: 27
PIF_VALUE: 27
PIF_VALUE: 1
PIF_VALUE: 26
PIF_VALUE: 1
PIF_VALUE: 1
PIF_VALUE: 0
PIF_VALUE: 27
PIF_VALUE: 1
PIF_VALUE: 26
PIF_VALUE: 25
PIF_VALUE: 1
PIF_VALUE: 29
PIF_VALUE: 29
PIF_VALUE: 2
PIF_VALUE: 29
PIF_VALUE: 25
PIF_VALUE: 1
PIF_VALUE: 26
PIF_VALUE: 27
PIF_VALUE: 25
PIF_VALUE: 28
PIF_VALUE: 1
PIF_VALUE: 3

## 2019-04-08 ASSESSMENT — PAIN DESCRIPTION - PAIN TYPE
TYPE: ACUTE PAIN
TYPE: ACUTE PAIN;SURGICAL PAIN
TYPE: ACUTE PAIN

## 2019-04-08 ASSESSMENT — PAIN SCALES - GENERAL
PAINLEVEL_OUTOF10: 0
PAINLEVEL_OUTOF10: 7
PAINLEVEL_OUTOF10: 0
PAINLEVEL_OUTOF10: 2
PAINLEVEL_OUTOF10: 0
PAINLEVEL_OUTOF10: 5
PAINLEVEL_OUTOF10: 10
PAINLEVEL_OUTOF10: 0
PAINLEVEL_OUTOF10: 5

## 2019-04-08 ASSESSMENT — PAIN DESCRIPTION - PROGRESSION
CLINICAL_PROGRESSION: NOT CHANGED

## 2019-04-08 ASSESSMENT — PAIN DESCRIPTION - FREQUENCY
FREQUENCY: INTERMITTENT
FREQUENCY: CONTINUOUS
FREQUENCY: INTERMITTENT

## 2019-04-08 ASSESSMENT — PAIN DESCRIPTION - DESCRIPTORS
DESCRIPTORS: DISCOMFORT;SORE
DESCRIPTORS: DISCOMFORT;CONSTANT
DESCRIPTORS: DISCOMFORT;SORE

## 2019-04-08 ASSESSMENT — PAIN DESCRIPTION - LOCATION
LOCATION: CHEST

## 2019-04-08 ASSESSMENT — PAIN DESCRIPTION - ONSET
ONSET: ON-GOING
ONSET: ON-GOING

## 2019-04-08 ASSESSMENT — PAIN DESCRIPTION - ORIENTATION: ORIENTATION: LEFT

## 2019-04-08 NOTE — ANESTHESIA PROCEDURE NOTES
Procedure Performed: JV       Start Time:  4/8/2019 7:03 AM       End Time:   4/8/2019 7:13 AM    Preanesthesia Checklist:  Patient identified, IV assessed, risks and benefits discussed, monitors and equipment assessed, procedure being performed at surgeon's request and anesthesia consent obtained. General Procedure Information  Diagnostic Indications for Echo:  assessment of surgical repair, hemodynamic monitoring and assessment of valve function  Physician Requesting Echo: David Garzon MD  Location performed:  OR  Intubated  Bite block placed  Heart visualized  Probe Insertion:  Easy  Probe Type:  Mulitplane  Modalities:  Color flow mapping, pulse wave Doppler, continuous wave Doppler and 2D only    Echocardiographic and Doppler Measurements    Ventricles    Right Ventricle:  Cavity size normal.  Hypertrophy not present. Thrombus not present. Global function normal.    Left Ventricle:  Cavity size normal.  Hypertrophy not present. Thrombus not present. Global Function normal.  Ejection Fraction 55%. Ventricular Regional Function:  1- Basal Anteroseptal:  normal  2- Basal Anterior:  normal  3- Basal Anterolateral:  normal  4- Basal Inferolateral:  normal  5- Basal Inferior:  normal  6- Basal Inferoseptal:  normal  7- Mid Anteroseptal:  normal  8- Mid Anterior:  normal  9- Mid Anterolateral:  normal  10- Mid Inferolateral:  normal  11- Mid Inferior:  normal  12- Mid Inferoseptal:  normal  13- Apical Anterior:  normal  14- Apical Lateral:  normal  15- Apical Inferior:  normal  16- Apical Septal:  normal  17- [place]:  normal      Valves    Aortic Valve: Annulus normal.  Stenosis not present. Regurgitation none. Leaflets normal.  Leaflet motions normal.      Mitral Valve: Annulus normal.  Stenosis not present. Regurgitation none. Leaflets normal.  Leaflet motions normal.      Tricuspid Valve: Annulus normal.  Stenosis not present. Regurgitation none.   Leaflets normal.  Leaflet motions normal. Pulmonic Valve: Annulus normal.  Stenosis not present. Regurgitation none. Aorta    Ascending Aorta:  Size normal.  Dissection not present. Aortic Arch:  Size normal.  Dissection not present. Descending Aorta:  Size normal.  Dissection not present. Atria    Right Atrium:  Size normal.  Spontaneous echo contrast not present. Thrombus not present. Tumor not present. Device not present. Left Atrium:  Size normal.  Spontaneous echo contrast not present. Thrombus not present. Tumor not present. Device not present. Left atrial appendage normal.      Septa    Atrial Septum:  Intra-atrial septal morphology normal.      Ventricular Septum:  Intra-ventricular septum morphology normal.      Diastolic Function Measurements:  Diastolic Dysfunction Grade= II (Pseudonormal)  E= ms  A= ms  E/A Ratio=   DT= ms  S/D=  IVRT=    Other Findings  Pericardium:  pericardial effusion  Pleural Effusion:  bilateral  Pulmonary Arteries:  normal  Pulmonary Venous Flow:  normal    Anesthesia Information  Performed Personally  Anesthesiologist:  Murali Washington DO      Echocardiogram Comments:       Large pericardial effusion and large pleural effusion noted prior to pericardial window. S/p pericardial window, good bi-ventricular function.

## 2019-04-08 NOTE — PROGRESS NOTES
P Quality Flow/Interdisciplinary Rounds Progress Note        Quality Flow Rounds held on April 8, 2019    Disciplines Attending:  Bedside Nurse, Charge nurse, MIHIR, OT, PT, , and . Tavo Tobar was admitted on 4/6/2019  4:55 PM    Anticipated Discharge Date:  Expected Discharge Date: 04/09/19    Disposition:    Wally Score:  Wally Scale Score: 22    Readmission Risk              Risk of Unplanned Readmission:        11           Discussed patient goal for the day, patient clinical progression, and barriers to discharge.   The following Goal(s) of the Day/Commitment(s) have been identified:  Post pericardial window, maintain in MICU until medically stable        Chandra Mancilla  April 8, 2019

## 2019-04-08 NOTE — ANESTHESIA PROCEDURE NOTES
Arterial Line:    An arterial line was placed using surface landmarks, in the OR for the following indication(s): continuous blood pressure monitoring and blood sampling needed. A 20 gauge (size), 1 and 3/8 inch (length), Angiocath (type) catheter was placed, Seldinger technique not used, into the right radial artery, secured by tape. Anesthesia type: Local  Local infiltration: Injection    Events:  patient tolerated procedure well with no complications.   4/8/2019 6:37 AM4/8/2019 6:47 AM  Anesthesiologist: Rebel Schwartz DO  Performed: Anesthesiologist   Preanesthetic Checklist  Completed: patient identified, site marked, surgical consent, pre-op evaluation, timeout performed, IV checked, risks and benefits discussed, monitors and equipment checked, anesthesia consent given, oxygen available and patient being monitored

## 2019-04-08 NOTE — PLAN OF CARE
Problem: Risk for Impaired Skin Integrity  Goal: Tissue integrity - skin and mucous membranes  Description  Structural intactness and normal physiological function of skin and  mucous membranes.   4/7/2019 2100 by Geri Zavala RN  Outcome: Met This Shift  4/7/2019 0804 by Cece Colvin RN  Outcome: Met This Shift

## 2019-04-08 NOTE — PROGRESS NOTES
Pt taken to surgery with OR transport tech without incidence, security called to  patient's earring, pts  is not here

## 2019-04-08 NOTE — PROGRESS NOTES
[address] [facility]  Department of Internal Medicine   Internal Medicine Residency   MICU Progress Note    Patient:  Juana Dodge 59 y.o. female  MRN: [id number]     Date of Service: 4/8/2019    Allergy: Penicillins    Subjective     Patient seen and examined at bedside this morning. Return to floor after pericardiocentesis. Admits to breathing difficulties, chest discomfort. However breathing effort is better than yesterday. Tolerating nasal cannula 4L. Patient denies recent trauma, deer tick exposure, radiation to chest, last mammogram was normal, is not taking any medications that may have caused this.  24h change: Patient was pancultured yesterday, and is positive for Proteus mirabilis. Pancultures sent for pericardial fluid. ROS: Denies Fever/chills/N/V/D/C/Dysuria/Blood in stool or urine  Objective     VS: /72   Pulse 84   Temp 98.7 °F (37.1 °C)   Resp 24   Ht 4' 11\" (1.499 m)   Wt 129 lb (58.5 kg)   SpO2 95%   BMI 26.05 kg/m²   ABP (Arterial line BP): 105/88  ABP mean (Arterial line mean): 97 mmHg    I & O - 24hr:     Intake/Output Summary (Last 24 hours) at 4/8/2019 4469  Last data filed at 4/8/2019 0845  Gross per 24 hour   Intake 1845 ml   Output 1105 ml   Net 740 ml       Physical Exam:  · General Appearance: alert, appears stated age, cooperative, fatigued and mild distress  · Neck: no adenopathy, no carotid bruit, no JVD, supple, symmetrical, trachea midline and thyroid not enlarged, symmetric, no tenderness/mass/nodules  · Lung: No Breath sounds over the left lower hemithorax. Decreased breath sounds on the right, breath sounds clear bilaterally, subxiphoid drain  · Heart: regular rate and rhythm, S1, S2 normal, no murmur, click, rub or gallop  · Abdomen: soft, non-tender; bowel sounds normal; no masses,  no organomegaly  · Extremities:  extremities normal, atraumatic, no cyanosis or edema  · Musculoskeletal: No joint swelling, no muscle tenderness.  ROM normal in all joints of extremities.    · Neurologic: Mental status: Alert, oriented, thought content appropriate    Lines     site day    Art line   None    TLC None    PICC None    Peripheral Bilateral AC 4/6   Hemoaccess None    Oxygen:    nasal canula at 4L/min    ABG:   No results found for: PHART, PH, YEN3HLK, PCO2, PO2ART, PO2, MUF1VYV, HCO3, BEART, BE, THGBART, THB, UAS3HTM, Z6TLEEYP, O2SAT     Medications     Nutrition:   General diet  ATB:   Antibiotics  Start-End   Ancef  4/7-             Skin issues:   Patient currently has   Urinary cath  Isolation  Restraints  DVT prophylaxis/ GI prophylaxis,    PT/OT  SNF/NH placement  Palliative care consult   Labs     CBC:   Lab Results   Component Value Date    WBC 11.1 04/08/2019    RBC 3.02 04/08/2019    HGB 8.6 04/08/2019    HCT 27.7 04/08/2019    MCV 91.7 04/08/2019    MCH 28.5 04/08/2019    MCHC 31.0 04/08/2019    RDW 13.5 04/08/2019     04/08/2019    MPV 8.9 04/08/2019     CMP:    Lab Results   Component Value Date     04/08/2019    K 3.3 04/08/2019    K 3.4 04/08/2019     04/08/2019    CO2 19 04/08/2019    BUN 11 04/08/2019    CREATININE 0.4 04/08/2019    GFRAA >60 04/08/2019    LABGLOM >60 04/08/2019    GLUCOSE 155 04/08/2019    PROT 7.1 04/07/2019    LABALBU 3.1 04/07/2019    CALCIUM 7.5 04/08/2019    BILITOT 0.4 04/07/2019    ALKPHOS 170 04/07/2019    AST 90 04/07/2019     04/07/2019     Magnesium:    Lab Results   Component Value Date    MG 2.2 04/08/2019     Phosphorus:  No results found for: PHOS  LDH:  No results found for: LDH  ABG:  No results found for: PH, PCO2, PO2, HCO3, BE, THGB, TCO2, O2SAT    Imaging Studies:  CXR: No different  In technique no definite interim change      Resident's Assessment and Plan     Neurology  Pain control     Cardiovascular  Pericardial effusion   -History of recent viral illness, 2/2 viral induced pericarditis vs other   -CTS drain 350 mL of serosanguineous fluid, fluid sent for Gram stain and Lyme disease antibodies.   -Follow-up HIV, hepatitis panel, coxsackie, pancultures, anti-double-stranded DNA   -Start naproxen 500 twice a day   -Monitor chest tube output, amount over time   -Monitor vital signs      Pulmonology  Pleural effusion   -Decreased breath sounds over left hemithorax   -Chest x-ray shows large left pleural effusion   -Likely secondary to serositis    -Will monitor, consider thoracocentesis if no resolution      Hematology  Normocytic Anemia   -2/2 anemia of chronic disease   -Hemoglobin 8.6 today   -Obtain FOBT   -Follow CBC daily      Genitourinary  UTI   -Urine culture growing Proteus mirabilis   -Denies urinary symptoms at this time   -Will monitor for further urinary symptoms    DVT / GI prophylaxis: PC and Protonix    Diet: general    Disposition: ICU    Hospital course:   4/7/2019-patient was pan cultured. CTA chest showed pericardial effusion. Cardiothoracic surgery consulted, will perform pericardiocentesis tomorrow. Follow-up blood work for possible etiology. 4/8/2019- CTS drained 350 mL of serosanguineous fluid from the pericardium. Subxiphoid drain placed and put to suction. Monitor output, consider step down once stable. America Schulz MD, PGY-1    Attending physician: Dr. Andreina Landeros    I personally saw, examined and provided care for the patient. Radiographs, labs and medication list were reviewed by me independently. I spoke with bedside nursing, therapists and consultants. Critical care services and times documented are independent of procedures and multidisciplinary rounds with Residents. Additionally comprehensive, multidisciplinary rounds were conducted with the MICU team. The case was discussed in detail and plans for care were established. Review of Residents documentation was conducted and revisions were made as appropriate. I agree with the above documented exam, problem list and plan of care.     Pain control   Anti-inflammatory medications   Follow cultures   Follow drain output. Ayana Knutson M.D.    Pulmonary/Critical Care Medicine   35 min cct excluding procedures

## 2019-04-08 NOTE — OP NOTE
proceed. DESCRIPTION OF OPERATION:  After adequate informed consent was obtained  and adequate preoperative antibiotics were given, the patient was  brought to the operating room in stable condition. She was laid in  supine position and induced MAC anesthesia by anesthesia staff. I  performed bedside ultrasound. I did not feel there is enough fluid  apically for me to do a pericardiocentesis safely. Therefore, the  patient was induced general endotracheal anesthesia. Her chest and  abdomen were prepped in the usual sterile fashion. A 2-inch vertical  incision was made overlying the xiphoid. Dissection was carried down to  the xiphoid using Bovie electrocautery. Retrosternal space was  dissected out, and the pericardium was identified. The pericardium was  entered, and 350 mL of pericardial fluid were removed. Pericardial  space was completely drained. A 28-Nepali Génesis Nam was then tunneled from  separate incision into the pericardial space. This was secured  appropriately. Local medication was injected for postoperative  analgesia. The fascia was closed with 0 Vicryl. The remainder of the  wound was closed in multiple layers of absorbable stitch. Dry sterile  dressing applied. The patient tolerated the procedure well. The  patient was transferred to the recovery room in stable condition. All  sponge, instrument, and needle counts were correct at the end of the  case.         Frank Ortiz MD    D: 04/08/2019 7:25:35       T: 04/08/2019 9:04:38     [email or web address]  Job#: [id number]     Doc#: [id number]    CC:  MD Guera Bond Lajune Guy, MD Eddy Bolds, MD Franchester Slider, DO

## 2019-04-08 NOTE — PROGRESS NOTES
Hospital Medicine    Subjective:  Pt seen in micu postop s/p pericardial window / chest tube placement pt alert conversive c/o pain      Current Facility-Administered Medications:     sodium chloride flush 0.9 % injection 10 mL, 10 mL, Intravenous, 2 times per day, Chester Patino MD, 10 mL at 04/08/19 1404    sodium chloride flush 0.9 % injection 10 mL, 10 mL, Intravenous, PRN, Chester Patino MD    ceFAZolin (ANCEF) 2 g in dextrose 3 % 50 mL IVPB (duplex), 2 g, Intravenous, Q8H, Chester Patino MD, Last Rate: 100 mL/hr at 04/08/19 1435, 2 g at 04/08/19 1435    acetaminophen (TYLENOL) tablet 650 mg, 650 mg, Oral, Q6H, Chester Patino MD    docusate sodium (COLACE) capsule 100 mg, 100 mg, Oral, BID, Chester Patino MD, 100 mg at 04/08/19 1430    ipratropium-albuterol (DUONEB) nebulizer solution 1 ampule, 1 ampule, Inhalation, Q4H WA, Chester Patino MD, 1 ampule at 04/08/19 1222    HYDROcodone-acetaminophen (NORCO) 5-325 MG per tablet 1 tablet, 1 tablet, Oral, Q4H PRN **OR** HYDROcodone-acetaminophen (NORCO) 5-325 MG per tablet 2 tablet, 2 tablet, Oral, Q4H PRN, Chester Patino MD, 2 tablet at 04/08/19 1134    naproxen (NAPROSYN) tablet 500 mg, 500 mg, Oral, BID , Lizzie Dunn MD    potassium chloride 10 mEq/100 mL IVPB (Peripheral Line), 10 mEq, Intravenous, Q1H, Lizzie Dunn MD, Last Rate: 100 mL/hr at 04/08/19 1445, 10 mEq at 04/08/19 1445    rosuvastatin (CRESTOR) tablet 20 mg, 20 mg, Oral, Daily, Chester Patino MD, 20 mg at 04/06/19 2029    sodium chloride flush 0.9 % injection 10 mL, 10 mL, Intravenous, 2 times per day, Chester Patino MD, 10 mL at 04/08/19 1019    sodium chloride flush 0.9 % injection 10 mL, 10 mL, Intravenous, PRN, Chester Patino MD    ondansetron [facility] injection 4 mg, 4 mg, Intravenous, Q6H PRN, Chester Patino MD    pantoprazole (PROTONIX) tablet 40 mg, 40 mg, Oral, QAM AC, Chester Patino MD, Stopped at 04/07/19 0797    Objective:    /72   Pulse 92   Temp 98.2 °F (36.8 °C) Resp 27   Ht 4' 11\" (1.499 m)   Wt 129 lb (58.5 kg)   SpO2 95%   BMI 26.05 kg/m²     Heart:  Reg  Lungs:  CTA bilaterally, no wheeze, rales or rhonchi  Abd: bowel sounds present, nontender, nondistended, no masses  Extrem:  No clubbing, cyanosis, or edema    CBC with Differential:    Lab Results   Component Value Date    WBC 11.1 04/08/2019    RBC 3.02 04/08/2019    HGB 8.6 04/08/2019    HCT 27.7 04/08/2019     04/08/2019    MCV 91.7 04/08/2019    MCH 28.5 04/08/2019    MCHC 31.0 04/08/2019    RDW 13.5 04/08/2019    LYMPHOPCT 17.5 04/08/2019    MONOPCT 6.9 04/08/2019    BASOPCT 0.3 04/08/2019    MONOSABS 0.70 04/08/2019    LYMPHSABS 1.78 04/08/2019    EOSABS 0.07 04/08/2019    BASOSABS 0.03 04/08/2019     CMP:    Lab Results   Component Value Date     04/08/2019    K 3.3 04/08/2019    K 3.4 04/08/2019     04/08/2019    CO2 19 04/08/2019    BUN 11 04/08/2019    CREATININE 0.4 04/08/2019    GFRAA >60 04/08/2019    LABGLOM >60 04/08/2019    GLUCOSE 155 04/08/2019    PROT 6.0 04/08/2019    LABALBU 2.4 04/08/2019    CALCIUM 7.5 04/08/2019    BILITOT 0.4 04/07/2019    ALKPHOS 170 04/07/2019    AST 90 04/07/2019     04/07/2019     Warfarin PT/INR:    Lab Results   Component Value Date    INR 1.5 04/06/2019    INR 1.1 07/03/2015    PROTIME 16.4 (H) 04/06/2019    PROTIME 11.4 07/03/2015       Assessment:    Active Problems:    Pericardial effusion  Resolved Problems:    * No resolved hospital problems.  *      Plan:  Cont postop care as per cts pain control        Julianefelicia Galvinhans  3:05 PM  4/8/2019

## 2019-04-08 NOTE — BRIEF OP NOTE
Brief Postoperative Note    [facility]  YOB: 1955  [id number]    Pre-operative Diagnosis: Pericardial effusion    Post-operative Diagnosis: Same    Procedure: Sub xiphoid pericardial window    Anesthesia: General    Surgeons/Assistants: Terrie/Sha    Estimated Blood Loss: less than 50     Complications: None    Specimens:   ID Type Source Tests Collected by Time Destination   1 : PERICARDIAL FLUID Tissue Tissue ANAEROBIC CULTURE, FUNGUS CULTURE, GRAM STAIN, SURGICAL CULTURE, ACID FAST CULTURE WITH SMEAR aJmey Wilkerson MD 4/8/2019 0708    A : PERICARDIAL FLUID Tissue Fluid CYTOLOGY, NON-GYN Jamey Wilkerson MD 4/8/2019 0708        Implants:  * No implants in log *      Drains: * No LDAs found *    Findings: 350 cc serosang fluid, exudate on epicardial surface, minimal fluid apically so I did not do a pericardiocentesis    Jamey Wilkerson MD  Date: 4/8/2019  Time: 7:20 AM

## 2019-04-09 ENCOUNTER — APPOINTMENT (OUTPATIENT)
Dept: GENERAL RADIOLOGY | Age: 64
DRG: 315 | End: 2019-04-09
Attending: INTERNAL MEDICINE
Payer: COMMERCIAL

## 2019-04-09 LAB
ALBUMIN SERPL-MCNC: 2.9 G/DL (ref 3.5–5.2)
ALP BLD-CCNC: 143 U/L (ref 35–104)
ALT SERPL-CCNC: 94 U/L (ref 0–32)
ANION GAP SERPL CALCULATED.3IONS-SCNC: 11 MMOL/L (ref 7–16)
AST SERPL-CCNC: 38 U/L (ref 0–31)
BILIRUB SERPL-MCNC: 0.2 MG/DL (ref 0–1.2)
BILIRUBIN DIRECT: <0.2 MG/DL (ref 0–0.3)
BILIRUBIN, INDIRECT: ABNORMAL MG/DL (ref 0–1)
BUN BLDV-MCNC: 19 MG/DL (ref 8–23)
CALCIUM SERPL-MCNC: 8.3 MG/DL (ref 8.6–10.2)
CHLORIDE BLD-SCNC: 108 MMOL/L (ref 98–107)
CO2: 19 MMOL/L (ref 22–29)
CREAT SERPL-MCNC: 0.6 MG/DL (ref 0.5–1)
GFR AFRICAN AMERICAN: >60
GFR NON-AFRICAN AMERICAN: >60 ML/MIN/1.73
GLUCOSE BLD-MCNC: 137 MG/DL (ref 74–99)
GRAM STAIN ORDERABLE: NORMAL
HCT VFR BLD CALC: 27.8 % (ref 34–48)
HEMOGLOBIN: 8.9 G/DL (ref 11.5–15.5)
MCH RBC QN AUTO: 29 PG (ref 26–35)
MCHC RBC AUTO-ENTMCNC: 32 % (ref 32–34.5)
MCV RBC AUTO: 90.6 FL (ref 80–99.9)
ORGANISM: ABNORMAL
PDW BLD-RTO: 13.5 FL (ref 11.5–15)
PLATELET # BLD: 493 E9/L (ref 130–450)
PMV BLD AUTO: 8.8 FL (ref 7–12)
POTASSIUM SERPL-SCNC: 4.6 MMOL/L (ref 3.5–5)
RBC # BLD: 3.07 E12/L (ref 3.5–5.5)
SODIUM BLD-SCNC: 138 MMOL/L (ref 132–146)
TOTAL PROTEIN: 6.6 G/DL (ref 6.4–8.3)
URINE CULTURE, ROUTINE: ABNORMAL
URINE CULTURE, ROUTINE: ABNORMAL
WBC # BLD: 12.7 E9/L (ref 4.5–11.5)

## 2019-04-09 PROCEDURE — 85027 COMPLETE CBC AUTOMATED: CPT

## 2019-04-09 PROCEDURE — 1200000000 HC SEMI PRIVATE

## 2019-04-09 PROCEDURE — 6370000000 HC RX 637 (ALT 250 FOR IP): Performed by: INTERNAL MEDICINE

## 2019-04-09 PROCEDURE — 2580000003 HC RX 258: Performed by: INTERNAL MEDICINE

## 2019-04-09 PROCEDURE — 6370000000 HC RX 637 (ALT 250 FOR IP): Performed by: THORACIC SURGERY (CARDIOTHORACIC VASCULAR SURGERY)

## 2019-04-09 PROCEDURE — 6370000000 HC RX 637 (ALT 250 FOR IP): Performed by: STUDENT IN AN ORGANIZED HEALTH CARE EDUCATION/TRAINING PROGRAM

## 2019-04-09 PROCEDURE — 80076 HEPATIC FUNCTION PANEL: CPT

## 2019-04-09 PROCEDURE — 71045 X-RAY EXAM CHEST 1 VIEW: CPT

## 2019-04-09 PROCEDURE — 2700000000 HC OXYGEN THERAPY PER DAY

## 2019-04-09 PROCEDURE — 80048 BASIC METABOLIC PNL TOTAL CA: CPT

## 2019-04-09 PROCEDURE — 2580000003 HC RX 258: Performed by: THORACIC SURGERY (CARDIOTHORACIC VASCULAR SURGERY)

## 2019-04-09 PROCEDURE — 36415 COLL VENOUS BLD VENIPUNCTURE: CPT

## 2019-04-09 RX ORDER — COLCHICINE 0.6 MG/1
0.6 TABLET ORAL 2 TIMES DAILY
Status: DISCONTINUED | OUTPATIENT
Start: 2019-04-09 | End: 2019-04-10 | Stop reason: HOSPADM

## 2019-04-09 RX ORDER — IPRATROPIUM BROMIDE AND ALBUTEROL SULFATE 2.5; .5 MG/3ML; MG/3ML
1 SOLUTION RESPIRATORY (INHALATION) EVERY 4 HOURS PRN
Status: DISCONTINUED | OUTPATIENT
Start: 2019-04-09 | End: 2019-04-10 | Stop reason: HOSPADM

## 2019-04-09 RX ADMIN — NAPROXEN 500 MG: 500 TABLET ORAL at 08:43

## 2019-04-09 RX ADMIN — COLCHICINE 0.6 MG: 0.6 TABLET, FILM COATED ORAL at 21:11

## 2019-04-09 RX ADMIN — ACETAMINOPHEN 650 MG: 325 TABLET, FILM COATED ORAL at 08:43

## 2019-04-09 RX ADMIN — ROSUVASTATIN CALCIUM 20 MG: 20 TABLET, FILM COATED ORAL at 08:44

## 2019-04-09 RX ADMIN — NAPROXEN 500 MG: 500 TABLET ORAL at 18:32

## 2019-04-09 RX ADMIN — PANTOPRAZOLE SODIUM 40 MG: 40 TABLET, DELAYED RELEASE ORAL at 06:40

## 2019-04-09 RX ADMIN — COLCHICINE 0.6 MG: 0.6 TABLET, FILM COATED ORAL at 12:51

## 2019-04-09 RX ADMIN — Medication 10 ML: at 15:20

## 2019-04-09 RX ADMIN — ACETAMINOPHEN 650 MG: 325 TABLET, FILM COATED ORAL at 23:39

## 2019-04-09 RX ADMIN — DOCUSATE SODIUM 100 MG: 100 CAPSULE, LIQUID FILLED ORAL at 12:51

## 2019-04-09 RX ADMIN — ACETAMINOPHEN 650 MG: 325 TABLET, FILM COATED ORAL at 18:32

## 2019-04-09 RX ADMIN — DOCUSATE SODIUM 100 MG: 100 CAPSULE, LIQUID FILLED ORAL at 21:11

## 2019-04-09 RX ADMIN — Medication 10 ML: at 21:11

## 2019-04-09 ASSESSMENT — PAIN SCALES - GENERAL
PAINLEVEL_OUTOF10: 0

## 2019-04-09 NOTE — PROGRESS NOTES
POD#1 Awake, alert. No complaints. Denies CP, palpitations, SOB at rest, dizziness/lightheadedness. Vitals:    04/09/19 0300 04/09/19 0400 04/09/19 0500 04/09/19 0600   BP: 92/61 89/63 88/60 96/60   Pulse: 83 82 79 79   Resp: 24 15 15 24   Temp:  98.1 °F (36.7 °C)     TempSrc:  Temporal     SpO2: 94% 94% 97% 96%   Weight:    128 lb 12.8 oz (58.4 kg)   Height:         O2: 2L/NC      Intake/Output Summary (Last 24 hours) at 4/9/2019 0918  Last data filed at 4/9/2019 0600  Gross per 24 hour   Intake 997 ml   Output 420 ml   Net 577 ml         CT output: Mediastinal: 100mL/8hr (195mL/24hrs)           Recent Labs     04/08/19  0455 04/08/19  0817 04/09/19  0535   WBC 10.2 11.1 12.7*   HGB 8.9* 8.6* 8.9*   HCT 28.4* 27.7* 27.8*   * 467* 493*      Recent Labs     04/08/19  0455 04/08/19  0817 04/09/19  0535   BUN 12 11 19   CREATININE 0.5 0.4* 0.6         Telemetry: NSR      PE  Cardiac: RRR  Lungs: decreased bases  Chest incision C/D/I, approximated, no erythema. Chest tube x 1 present and secure. Abd: Soft, nontender, +BS  Ext: MUSE      A/P: Stable s/p subxiphoid pericardial window, POD#1  Chest tube without significant drainage. Chest tube removed without difficulty. Patient tolerated well  Will schedule f/u in office for suture removal  CTS to sign off.  Please call with any questions or concerns  Ok to transfer out of the ICU from CTS POV   Increase activity as tolerated   Encourage incentive spirometry  This patient's case and care plan was discussed with the attending surgeon

## 2019-04-09 NOTE — PLAN OF CARE
Problem: Risk for Impaired Skin Integrity  Goal: Tissue integrity - skin and mucous membranes  Description  Structural intactness and normal physiological function of skin and  mucous membranes.   Outcome: Met This Shift     Problem: Falls - Risk of:  Goal: Will remain free from falls  Description  Will remain free from falls  Outcome: Met This Shift     Problem: Falls - Risk of:  Goal: Absence of physical injury  Description  Absence of physical injury  Outcome: Met This Shift     Problem: Pain:  Goal: Control of acute pain  Description  Control of acute pain  Outcome: Met This Shift

## 2019-04-09 NOTE — PROGRESS NOTES
[facility] Quality Flow/Interdisciplinary Rounds Progress Note        Quality Flow Rounds held on April 9, 2019    Disciplines Attending:  Bedside Nurse,  and Nursing Unit Leadership    Guerry Angelucci was admitted on 4/6/2019  4:55 PM    Anticipated Discharge Date:  Expected Discharge Date: 04/13/19    Disposition:    Wally Score:  Wally Scale Score: 22    Readmission Risk              Risk of Unplanned Readmission:        8           Discussed patient goal for the day, patient clinical progression, and barriers to discharge. The following Goal(s) of the Day/Commitment(s) have been identified:  Transfer to med/surg.        Quita Escobar  April 9, 2019

## 2019-04-09 NOTE — PROGRESS NOTES
Hospital Medicine    Subjective:  Pt seen this pod #1 pain improved      Current Facility-Administered Medications:     colchicine (COLCRYS) tablet 0.6 mg, 0.6 mg, Oral, BID, Leigh Ann Aguero MD, 0.6 mg at 04/09/19 1251    ipratropium-albuterol (DUONEB) nebulizer solution 1 ampule, 1 ampule, Inhalation, Q4H PRN, Leigh Ann Aguero MD    sodium chloride flush 0.9 % injection 10 mL, 10 mL, Intravenous, 2 times per day, Keeley West MD, 10 mL at 04/09/19 1520    sodium chloride flush 0.9 % injection 10 mL, 10 mL, Intravenous, PRN, Keeley West MD    acetaminophen (TYLENOL) tablet 650 mg, 650 mg, Oral, Q6H, Keeley West MD, 650 mg at 04/09/19 1832    docusate sodium (COLACE) capsule 100 mg, 100 mg, Oral, BID, Keeley West MD, 100 mg at 04/09/19 1251    HYDROcodone-acetaminophen (NORCO) 5-325 MG per tablet 1 tablet, 1 tablet, Oral, Q4H PRN **OR** HYDROcodone-acetaminophen (NORCO) 5-325 MG per tablet 2 tablet, 2 tablet, Oral, Q4H PRN, Keeley West MD, 2 tablet at 04/08/19 1134    naproxen (NAPROSYN) tablet 500 mg, 500 mg, Oral, BID WC, Chato Jenkins MD, 500 mg at 04/09/19 1832    rosuvastatin (CRESTOR) tablet 20 mg, 20 mg, Oral, Daily, Keeley West MD, 20 mg at 04/09/19 0844    sodium chloride flush 0.9 % injection 10 mL, 10 mL, Intravenous, 2 times per day, Keeley West MD, 10 mL at 04/08/19 2012    sodium chloride flush 0.9 % injection 10 mL, 10 mL, Intravenous, PRN, Keeley West MD    ondansetron [facility]) injection 4 mg, 4 mg, Intravenous, Q6H PRN, Keeley West MD    pantoprazole (PROTONIX) tablet 40 mg, 40 mg, Oral, QAM AC, Keeley West MD, 40 mg at 04/09/19 0640    Objective:    /63   Pulse 94   Temp 98.7 °F (37.1 °C) (Temporal)   Resp 18   Ht 4' 11\" (1.499 m)   Wt 128 lb 12.8 oz (58.4 kg)   SpO2 95%   BMI 26.01 kg/m²     Heart:  Reg  Lungs:  CTA bilaterally, no wheeze, rales or rhonchi  Abd: bowel sounds present, nontender, nondistended, no masses  Extrem:  No clubbing, cyanosis, or edema    CBC with Differential:    Lab Results   Component Value Date    WBC 12.7 04/09/2019    RBC 3.07 04/09/2019    HGB 8.9 04/09/2019    HCT 27.8 04/09/2019     04/09/2019    MCV 90.6 04/09/2019    MCH 29.0 04/09/2019    MCHC 32.0 04/09/2019    RDW 13.5 04/09/2019    LYMPHOPCT 17.5 04/08/2019    MONOPCT 6.9 04/08/2019    BASOPCT 0.3 04/08/2019    MONOSABS 0.70 04/08/2019    LYMPHSABS 1.78 04/08/2019    EOSABS 0.07 04/08/2019    BASOSABS 0.03 04/08/2019     CMP:    Lab Results   Component Value Date     04/09/2019    K 4.6 04/09/2019    K 3.4 04/08/2019     04/09/2019    CO2 19 04/09/2019    BUN 19 04/09/2019    CREATININE 0.6 04/09/2019    GFRAA >60 04/09/2019    LABGLOM >60 04/09/2019    GLUCOSE 137 04/09/2019    PROT 6.6 04/09/2019    LABALBU 2.9 04/09/2019    CALCIUM 8.3 04/09/2019    BILITOT 0.2 04/09/2019    ALKPHOS 143 04/09/2019    AST 38 04/09/2019    ALT 94 04/09/2019     Warfarin PT/INR:    Lab Results   Component Value Date    INR 1.5 04/06/2019    INR 1.1 07/03/2015    PROTIME 16.4 (H) 04/06/2019    PROTIME 11.4 07/03/2015       Assessment:    Active Problems:    Pericardial effusion  Resolved Problems:    * No resolved hospital problems.  *  pod # 1 s/p pericardial window  Hyperlipidemia                         Plan:  Cont postop care chest tube per cts        Denzel Jara  6:51 PM  4/9/2019

## 2019-04-09 NOTE — PROGRESS NOTES
[address] [facility]   Department of Internal Medicine   Internal Medicine Residency  MICU Progress Note    Patient:  Ariel Gonzalez 59 y.o. female   MRN: [id number]       Date of Service: 4/9/2019    Allergy: Penicillins    Subjective     Pt seen, met lying in bed. Feels a lot better today, less short of breath. Resting comfortably on 2 L oxygen by NC and saturating at 97%. Chest tube with minimal drainage overnight~195 cc, removed by CTS this morning. Still awaiting results of outstanding labs. Will commence combination trial of Naproxen and Colchicine for prevention of recurrence. Objective     Vitals:    04/09/19 0300 04/09/19 0400 04/09/19 0500 04/09/19 0600   BP: 92/61 89/63 88/60 96/60   Pulse: 83 82 79 79   Resp: 24 15 15 24   Temp:  98.1 °F (36.7 °C)     TempSrc:  Temporal     SpO2: 94% 94% 97% 96%   Weight:    128 lb 12.8 oz (58.4 kg)   Height:           Physical Exam:  · I & O - 24hr:No intake/output data recorded. Physical Exam   Constitutional: She is oriented to person, place, and time. She appears well-developed and well-nourished. No distress. HENT:   Head: Normocephalic and atraumatic. Eyes: Pupils are equal, round, and reactive to light. Conjunctivae are normal. No scleral icterus. Cardiovascular: Normal rate, regular rhythm, S1 normal, S2 normal, normal heart sounds and intact distal pulses. No murmur heard. Pulmonary/Chest: Effort normal. No respiratory distress. She has no wheezes. She has no rales. On 2 L oxygen by NC  Still with reduced bs on left lung field, improving  Subxiphoid stiches present. Abdominal: Soft. Bowel sounds are normal. She exhibits no distension and no mass. There is no tenderness. There is no guarding. Musculoskeletal: She exhibits tenderness. She exhibits no edema. Mild tenderness in lower extremities, R>L   Neurological: She is alert and oriented to person, place, and time. No cranial nerve deficit. Skin: Skin is warm and dry.  She is not Value Ref Range Status   04/07/2019 24 Hours- no growth  Preliminary     Respiratory cultures No results found for: RESPCULTURE No results found for: LABGRAM  Urine   Urine Culture, Routine   Date Value Ref Range Status   04/07/2019 >100,000 CFU/ml  Sensitivity to follow    Preliminary     Legionella No results found for: LABLEGI  C Diff PCR No results found for: CDIFPCR  Wound culture/abscess: No results for input(s): WNDABS in the last 72 hours. Tip culture:No results for input(s): CXCATHTIP in the last 72 hours. Resident's Assessment and PLan     Cardiovascular  Subacute moderate pericardial effusion  - etiology still elusive  - s/p pericardiocentesis 4/8/19 with drainage of 350 cc serosanguinous fluid intra-op and 195 cc overnight  - chest tube removed today uneventfully  - no obvious evidence of cardiac tamponade or hemodynamic compromise  - CT chest showing large pericardial effusion 2.7 cm, Echo done showed EF 60% effusion 1.5 cm - 1.8cm without tamponade(no RV free wall or right atrial wall diastolic collapse, mild PHTN 40 mmHg.   - , CRP 24.5, troponin negative  - EKG not showing low voltage or pulsus alternans  - procal, pan cultures, HIV, Hepatitis panel, Anti DS DNA, RF, viral panel-negative,TSH-3  - obtain outstanding labs  - initiate trial of Naproxen/Colchicne combination  - for incentive spirometry    Pulmonology  B/L pleural effusion  - initially considered for IR-guided drainage, but interval CXR showing improvement  - will hold off on thoracentesis for now    Gastroenterology  Transaminitis  - resolving  - AST 90//  - follow-up hepatitis panel    Hematology  Normocytic anemia  - likely 2/2 AOCD  - Hb 8.9 on admission, today 9.5  - Fe 16/TIBC 163/Ferritin 1000  - obtain FOBT  - monitor CBC daily     Genitourinary  Asymptomatic bacteriuria  - urine cx growing proteus mirabilis  - no indication for treatment at this time     DVT/GI prophylaxis: PCD/Protonix  Disposition: Transfer  Max Habermann, M.D. , PGY-1  Internal Medicine     Attending Physician: Dr. Mary Lou Tan    I personally saw, examined and provided care for the patient. Radiographs, labs and medication list were reviewed by me independently. I spoke with bedside nursing, therapists and consultants. Critical care services and times documented are independent of procedures and multidisciplinary rounds with Residents. Additionally comprehensive, multidisciplinary rounds were conducted with the MICU team. The case was discussed in detail and plans for care were established. Review of Residents documentation was conducted and revisions were made as appropriate. I agree with the above documented exam, problem list and plan of care. Doing well   Ok for transfer   May need thoracentesis of the left effusion. Follow cxr   Mary Lou Tan M.D.    Pulmonary/Critical Care Medicine

## 2019-04-10 VITALS
RESPIRATION RATE: 16 BRPM | HEIGHT: 59 IN | DIASTOLIC BLOOD PRESSURE: 63 MMHG | HEART RATE: 93 BPM | TEMPERATURE: 98.9 F | OXYGEN SATURATION: 95 % | SYSTOLIC BLOOD PRESSURE: 114 MMHG | WEIGHT: 128.8 LBS | BODY MASS INDEX: 25.96 KG/M2

## 2019-04-10 LAB
ANION GAP SERPL CALCULATED.3IONS-SCNC: 3 MMOL/L (ref 7–16)
BODY FLUID CULTURE, STERILE: NORMAL
BUN BLDV-MCNC: 19 MG/DL (ref 8–23)
CALCIUM SERPL-MCNC: 8.5 MG/DL (ref 8.6–10.2)
CCP IGG ANTIBODIES: 4 UNITS (ref 0–19)
CHLORIDE BLD-SCNC: 113 MMOL/L (ref 98–107)
CO2: 22 MMOL/L (ref 22–29)
CREAT SERPL-MCNC: 0.7 MG/DL (ref 0.5–1)
GFR AFRICAN AMERICAN: >60
GFR NON-AFRICAN AMERICAN: >60 ML/MIN/1.73
GLUCOSE BLD-MCNC: 95 MG/DL (ref 74–99)
GRAM STAIN RESULT: NORMAL
HCT VFR BLD CALC: 29.7 % (ref 34–48)
HEMOGLOBIN: 9.4 G/DL (ref 11.5–15.5)
MCH RBC QN AUTO: 28.6 PG (ref 26–35)
MCHC RBC AUTO-ENTMCNC: 31.6 % (ref 32–34.5)
MCV RBC AUTO: 90.3 FL (ref 80–99.9)
PDW BLD-RTO: 13.8 FL (ref 11.5–15)
PLATELET # BLD: 512 E9/L (ref 130–450)
PMV BLD AUTO: 8.8 FL (ref 7–12)
POTASSIUM SERPL-SCNC: 4 MMOL/L (ref 3.5–5)
RBC # BLD: 3.29 E12/L (ref 3.5–5.5)
SODIUM BLD-SCNC: 138 MMOL/L (ref 132–146)
WBC # BLD: 10.1 E9/L (ref 4.5–11.5)

## 2019-04-10 PROCEDURE — 6370000000 HC RX 637 (ALT 250 FOR IP): Performed by: INTERNAL MEDICINE

## 2019-04-10 PROCEDURE — 85027 COMPLETE CBC AUTOMATED: CPT

## 2019-04-10 PROCEDURE — 80048 BASIC METABOLIC PNL TOTAL CA: CPT

## 2019-04-10 PROCEDURE — 36415 COLL VENOUS BLD VENIPUNCTURE: CPT

## 2019-04-10 PROCEDURE — 2580000003 HC RX 258: Performed by: INTERNAL MEDICINE

## 2019-04-10 RX ORDER — NAPROXEN 500 MG/1
500 TABLET ORAL 2 TIMES DAILY WITH MEALS
Qty: 14 TABLET | Refills: 0 | Status: SHIPPED | OUTPATIENT
Start: 2019-04-10 | End: 2019-04-30 | Stop reason: ALTCHOICE

## 2019-04-10 RX ORDER — COLCHICINE 0.6 MG/1
0.6 TABLET ORAL 2 TIMES DAILY
Qty: 14 TABLET | Refills: 0 | Status: SHIPPED | OUTPATIENT
Start: 2019-04-10 | End: 2019-04-30 | Stop reason: ALTCHOICE

## 2019-04-10 RX ORDER — ACETAMINOPHEN 325 MG/1
650 TABLET ORAL EVERY 6 HOURS
Qty: 120 TABLET | Refills: 3 | COMMUNITY
Start: 2019-04-10

## 2019-04-10 RX ADMIN — COLCHICINE 0.6 MG: 0.6 TABLET, FILM COATED ORAL at 09:11

## 2019-04-10 RX ADMIN — NAPROXEN 500 MG: 500 TABLET ORAL at 09:11

## 2019-04-10 RX ADMIN — Medication 10 ML: at 09:14

## 2019-04-10 RX ADMIN — DOCUSATE SODIUM 100 MG: 100 CAPSULE, LIQUID FILLED ORAL at 09:11

## 2019-04-10 RX ADMIN — ROSUVASTATIN CALCIUM 20 MG: 20 TABLET, FILM COATED ORAL at 09:11

## 2019-04-10 RX ADMIN — PANTOPRAZOLE SODIUM 40 MG: 40 TABLET, DELAYED RELEASE ORAL at 06:38

## 2019-04-10 ASSESSMENT — PAIN SCALES - GENERAL
PAINLEVEL_OUTOF10: 0
PAINLEVEL_OUTOF10: 0

## 2019-04-10 NOTE — PROGRESS NOTES
Hospital Medicine    Subjective:  Pt alert conversive denies sob      Current Facility-Administered Medications:     colchicine (COLCRYS) tablet 0.6 mg, 0.6 mg, Oral, BID, Gomez Caceres MD, 0.6 mg at 04/09/19 2111    ipratropium-albuterol (DUONEB) nebulizer solution 1 ampule, 1 ampule, Inhalation, Q4H PRN, Gomez Caceres MD    sodium chloride flush 0.9 % injection 10 mL, 10 mL, Intravenous, 2 times per day, Georgina Reynaga MD, 10 mL at 04/09/19 2111    sodium chloride flush 0.9 % injection 10 mL, 10 mL, Intravenous, PRN, Georgina Reynaga MD    acetaminophen (TYLENOL) tablet 650 mg, 650 mg, Oral, Q6H, Georgina Reynaga MD, 650 mg at 04/09/19 2339    docusate sodium (COLACE) capsule 100 mg, 100 mg, Oral, BID, Georgina Reynaga MD, 100 mg at 04/09/19 2111    HYDROcodone-acetaminophen (NORCO) 5-325 MG per tablet 1 tablet, 1 tablet, Oral, Q4H PRN **OR** HYDROcodone-acetaminophen (NORCO) 5-325 MG per tablet 2 tablet, 2 tablet, Oral, Q4H PRN, Georgina Reynaga MD, 2 tablet at 04/08/19 1134    naproxen (NAPROSYN) tablet 500 mg, 500 mg, Oral, BID WC, Georgina Reynaga MD, 500 mg at 04/09/19 1832    rosuvastatin (CRESTOR) tablet 20 mg, 20 mg, Oral, Daily, Georgina Reynaga MD, 20 mg at 04/09/19 0844    sodium chloride flush 0.9 % injection 10 mL, 10 mL, Intravenous, 2 times per day, Georgina Reynaga MD, 10 mL at 04/08/19 2012    sodium chloride flush 0.9 % injection 10 mL, 10 mL, Intravenous, PRN, Georgina Reynaga MD    ondansetron (ZOFRAN) injection 4 mg, 4 mg, Intravenous, Q6H PRN, Georgina Reynaga MD    pantoprazole (PROTONIX) tablet 40 mg, 40 mg, Oral, QAM AC, Georgina Reynaga MD, 40 mg at 04/10/19 8920    Objective:    BP 90/62   Pulse 91   Temp 97.9 °F (36.6 °C) (Temporal)   Resp 16   Ht 4' 11\" (1.499 m)   Wt 128 lb 12.8 oz (58.4 kg)   SpO2 96%   BMI 26.01 kg/m²     Heart:  Reg  Lungs:  CTA bilaterally, no wheeze, rales or rhonchi  Abd: bowel sounds present, nontender, nondistended, no masses  Extrem:  No clubbing, cyanosis, or edema    CBC with Differential:    Lab Results   Component Value Date    WBC 12.7 04/09/2019    RBC 3.07 04/09/2019    HGB 8.9 04/09/2019    HCT 27.8 04/09/2019     04/09/2019    MCV 90.6 04/09/2019    MCH 29.0 04/09/2019    MCHC 32.0 04/09/2019    RDW 13.5 04/09/2019    LYMPHOPCT 17.5 04/08/2019    MONOPCT 6.9 04/08/2019    BASOPCT 0.3 04/08/2019    MONOSABS 0.70 04/08/2019    LYMPHSABS 1.78 04/08/2019    EOSABS 0.07 04/08/2019    BASOSABS 0.03 04/08/2019     CMP:    Lab Results   Component Value Date     04/09/2019    K 4.6 04/09/2019    K 3.4 04/08/2019     04/09/2019    CO2 19 04/09/2019    BUN 19 04/09/2019    CREATININE 0.6 04/09/2019    GFRAA >60 04/09/2019    LABGLOM >60 04/09/2019    GLUCOSE 137 04/09/2019    PROT 6.6 04/09/2019    LABALBU 2.9 04/09/2019    CALCIUM 8.3 04/09/2019    BILITOT 0.2 04/09/2019    ALKPHOS 143 04/09/2019    AST 38 04/09/2019    ALT 94 04/09/2019     Warfarin PT/INR:    Lab Results   Component Value Date    INR 1.5 04/06/2019    INR 1.1 07/03/2015    PROTIME 16.4 (H) 04/06/2019    PROTIME 11.4 07/03/2015       Assessment:    Active Problems:    Pericardial effusion  Resolved Problems:    * No resolved hospital problems.  *      Plan:  Dc home outpt f/u        Juan Malave  7:18 AM  4/10/2019

## 2019-04-10 NOTE — PLAN OF CARE
Problem: Falls - Risk of:  Goal: Will remain free from falls  Description  Will remain free from falls  4/10/2019 0125 by Irais David RN  Outcome: Met This Shift

## 2019-04-10 NOTE — PROGRESS NOTES
Jose De Jesus Holder M.D.,[facility]  Hollie Cornell D.O., F.A.C.O.I., Jaquan Lou M.D. Angela Jackson M.D., Britt Juárez M.D. Daily Pulmonary Progress Note    Patient:  Enzo Williamson 59 y.o. female MRN: [id number]     Date of Service: 4/10/2019      Synopsis     We are following patient for out of ICU management, status post pericardiocentesis, bilateral pleural effusions     \"CC\" shortness of breath    Code status: Full      Subjective      Patient was seen and examined. Feeling better today with breathing complaints. Ambulating in her room. Chest tube previously removed. Breathing well on room air, no cough, sputum, chest tightness, or wheeze. Review of Systems:  Constitutional: Denies fever, weight loss, night sweats, and fatigue  Skin: Denies pigmentation, dark lesions, and rashes   HEENT: Denies hearing loss, tinnitus, ear drainage, epistaxis, sore throat, and hoarseness. Cardiovascular: Denies palpitations, chest pain, and chest pressure. Respiratory: Denies cough, dyspnea at rest, hemoptysis, apnea, and choking.   Gastrointestinal: Denies nausea, vomiting, poor appetite, diarrhea, heartburn or reflux  Genitourinary: Denies dysuria, frequency, urgency or hematuria  Musculoskeletal: Denies myalgias, muscle weakness, and bone pain  Neurological: Denies dizziness, vertigo, headache, and focal weakness  Psychological: Denies anxiety and depression  Endocrine: Denies heat intolerance and cold intolerance  Hematopoietic/Lymphatic: Denies bleeding problems and blood transfusions    24-hour events:  Transfer out of ICU    Objective   Vitals: /63   Pulse 93   Temp 98.9 °F (37.2 °C) (Temporal)   Resp 16   Ht 4' 11\" (1.499 m)   Wt 128 lb 12.8 oz (58.4 kg)   SpO2 95%   BMI 26.01 kg/m²     I/O:  No intake or output data in the 24 hours ending 04/10/19 1217    Vent Information  Skin Assessment: Clean, dry, & intact  FiO2 : 40 %  I Time/ I Time %: 0.85 s       IPAP: 12 cmH20  EPAP: 6 cmH2O     Physical Exam:  General Appearance: appears comfortable in no acute distress. HEENT: Normocephalic atraumatic without obvious abnormality   Neck: Lips, mucosa, and tongue normal.  Supple, symmetrical, trachea midline, no adenopathy;thyroid:  no enlargement/tenderness/nodules or JVD. Lung: Breath sounds few basilar crackles right greater than left. No active wheeze. Respirations   unlabored. Symmetrical expansion. Heart: RRR, normal S1, S2. No MRG  Abdomen: Soft, NT, ND. BS present x 4 quadrants. No bruit or organomegaly. Extremities: Pedal pulses 2+ symmetric b/l. Extremities normal, no cyanosis, clubbing, or edema. Musculokeletal: No joint swelling, no muscle tenderness. ROM normal in all joints of extremities. Neurologic: Mental status: Alert and Oriented X3 . Pertinent/ New Labs and Imaging Studies     Imaging Personally Reviewed:  Impression       No evidence for pulmonary embolism or aortic dissection.       Large pericardial effusion measuring up to 2.7 cm in greatest   thickness/cross section.       Small right and moderate left pleural effusions with contiguous   atelectasis.         Findings:        Portable anterior view demonstrates mild cardiomegaly. There is left   basilar opacification. Left upper lung is now clear. Right lung is   clear.           Impression       Improvement on the left with persistent left basilar opacification           ECHO  Summary   Moderate to large circumferential pericardial effusion (1.5cm - 1.8cm),   without significant tamponade (no RV free wall or right atrial wall   diastolic collapse; IVC not dilated)   Left ventricular size is normal.   Normal LV segmental wall motion, EF 60%.   Left atrium is of normal size.  Interatrial septum not well visualized but   appears intact.   Normal right ventricle structure and function.   Normal right atrium.   Normal mitral valve structure and function.   No mitral valve prolapse.  [place] is trace

## 2019-04-11 LAB
BLOOD BANK DISPENSE STATUS: NORMAL
BLOOD BANK DISPENSE STATUS: NORMAL
BLOOD BANK PRODUCT CODE: NORMAL
BLOOD BANK PRODUCT CODE: NORMAL
BPU ID: NORMAL
BPU ID: NORMAL
COXSACKIE A9 AB: NORMAL
DESCRIPTION BLOOD BANK: NORMAL
DESCRIPTION BLOOD BANK: NORMAL

## 2019-04-12 NOTE — DISCHARGE SUMMARY
[address]                  [address] [facility],  [facility]                               DISCHARGE SUMMARY    PATIENT NAME: Cem Braxton                     :        1955  MED REC NO:   [id number]                            ROOM:       8421  ACCOUNT NO:   [de-identified]                           ADMIT DATE: 2019  PROVIDER:     Camilo Adkins DO                  DISCHARGE DATE:  04/10/2019      DISCHARGE DIAGNOSES:  1. Pericardial effusion. 2.  Hyperlipidemia. 3.  Osteoporosis. HOSPITAL COURSE:  The patient is a 69-year-old  female who  presented to the emergency room with complaints of shortness of breath. Diagnostic evaluation revealed pericardial effusion. The patient was  admitted to the hospital.  She was seen by Cardiothoracic Surgery as  well as Pulmonary Critical Care. She underwent pericardial window with  chest tube placement on 2019. Echocardiogram on 2019  revealed moderate to large pericardial effusion. Left ventricular  ejection fraction 60%. The patient's status stabilized. Her chest tube  was removed and she was discharged home in stable condition on  04/10/2019 with recommendations to follow up as an outpatient with  cardiothoracic surgery and with her primary care physician Dr. Inez Barnes. Call office for appointment. DISCHARGE MEDICATIONS:  As per discharge med records which include:  1. Tylenol p.r.n.  2.  Colchicine. 3.  Naproxen. 4.  Crestor. 5.  Fosamax. 6.  Calcium. 7.  Vitamin D.  8.  Zaditor eye drops as directed.           Esther Moyer DO    D: 2019 18:26:06       T: 2019 22:39:18     [email or web address]  Job#: [id number]     Doc#: [id number]    CC:  Inez Barnes MD

## 2019-04-13 LAB
ANAEROBIC CULTURE: NORMAL
BLOOD CULTURE, ROUTINE: NORMAL
CULTURE, BLOOD 2: NORMAL

## 2019-04-14 LAB
Lab: NORMAL
REPORT: NORMAL
THIS TEST SENT TO: NORMAL

## 2019-04-16 LAB
COXSACKIE B1 ANTIBODY: NORMAL
COXSACKIE B2 ANTIBODY: NORMAL
COXSACKIE B3 ANTIBODY: NORMAL
COXSACKIE B4 ANTIBODY: NORMAL
COXSACKIE B5 ANTIBODY: NORMAL
COXSACKIE B6 ANTIBODY: NORMAL

## 2019-04-17 LAB
EKG ATRIAL RATE: 110 BPM
EKG P AXIS: 35 DEGREES
EKG P-R INTERVAL: 120 MS
EKG Q-T INTERVAL: 342 MS
EKG QRS DURATION: 82 MS
EKG QTC CALCULATION (BAZETT): 462 MS
EKG R AXIS: 23 DEGREES
EKG T AXIS: 32 DEGREES
EKG VENTRICULAR RATE: 110 BPM

## 2019-04-23 ENCOUNTER — OFFICE VISIT (OUTPATIENT)
Dept: CARDIOTHORACIC SURGERY | Age: 64
End: 2019-04-23

## 2019-04-23 VITALS
BODY MASS INDEX: 25.24 KG/M2 | DIASTOLIC BLOOD PRESSURE: 81 MMHG | HEIGHT: 57 IN | SYSTOLIC BLOOD PRESSURE: 145 MMHG | HEART RATE: 95 BPM | WEIGHT: 117 LBS

## 2019-04-23 DIAGNOSIS — I31.39 PERICARDIAL EFFUSION: Primary | ICD-10-CM

## 2019-04-23 PROCEDURE — 99024 POSTOP FOLLOW-UP VISIT: CPT | Performed by: THORACIC SURGERY (CARDIOTHORACIC VASCULAR SURGERY)

## 2019-04-23 ASSESSMENT — ENCOUNTER SYMPTOMS
SHORTNESS OF BREATH: 0
COUGH: 0

## 2019-04-23 NOTE — PROGRESS NOTES
Subjective:      Chief Complaint   Patient presents with    Post-Op Check     no c/o        Patient ID: Freeman Butler is a 59 y.o. female who presents to office s/p subxiphoid pericardial window on 4/8/19. Patient presents to office for routine follow up and suture removal.  Patient states they feel well, and deny any CP, SOB or incisional problems     HPI    Review of Systems   Constitutional: Negative for chills and fever. Respiratory: Negative for cough and shortness of breath. Cardiovascular: Negative for chest pain and palpitations. Objective:   Physical Exam   Constitutional: She is oriented to person, place, and time. She appears well-developed and well-nourished. Cardiovascular: Normal rate, regular rhythm and normal heart sounds. Pulmonary/Chest: Effort normal and breath sounds normal.   Inc and chest tube site healing well without evidence of infection    Abdominal: Soft. Bowel sounds are normal.   Musculoskeletal: Normal range of motion. She exhibits no edema. Neurological: She is alert and oriented to person, place, and time. Assessment:      S/p subxiphoid pericardial window       Plan:      No activity restrictions   Patient may drive  Continue follow up with PCP, Cardiology as scheduled. Encouraged to call office with any questions, concerns. Otherwise no further follow up necessary from CTS standpoint.               Rashmi Hampton MD

## 2019-04-30 ENCOUNTER — APPOINTMENT (OUTPATIENT)
Dept: CT IMAGING | Age: 64
DRG: 187 | End: 2019-04-30
Payer: COMMERCIAL

## 2019-04-30 ENCOUNTER — HOSPITAL ENCOUNTER (INPATIENT)
Age: 64
LOS: 2 days | Discharge: HOME | DRG: 187 | End: 2019-05-02
Attending: EMERGENCY MEDICINE | Admitting: FAMILY MEDICINE
Payer: COMMERCIAL

## 2019-04-30 DIAGNOSIS — J90 RECURRENT LEFT PLEURAL EFFUSION: Primary | ICD-10-CM

## 2019-04-30 LAB
ANION GAP SERPL CALCULATED.3IONS-SCNC: 14 MMOL/L (ref 7–16)
BACTERIA: ABNORMAL /HPF
BASOPHILS ABSOLUTE: 0.03 E9/L (ref 0–0.2)
BASOPHILS RELATIVE PERCENT: 0.4 % (ref 0–2)
BILIRUBIN URINE: NEGATIVE
BLOOD, URINE: ABNORMAL
BUN BLDV-MCNC: 11 MG/DL (ref 8–23)
CALCIUM SERPL-MCNC: 9.7 MG/DL (ref 8.6–10.2)
CHLORIDE BLD-SCNC: 102 MMOL/L (ref 98–107)
CLARITY: CLEAR
CO2: 22 MMOL/L (ref 22–29)
COLOR: YELLOW
CREAT SERPL-MCNC: 0.7 MG/DL (ref 0.5–1)
EKG ATRIAL RATE: 109 BPM
EKG P AXIS: 40 DEGREES
EKG P-R INTERVAL: 138 MS
EKG Q-T INTERVAL: 340 MS
EKG QRS DURATION: 74 MS
EKG QTC CALCULATION (BAZETT): 457 MS
EKG R AXIS: 2 DEGREES
EKG T AXIS: 39 DEGREES
EKG VENTRICULAR RATE: 109 BPM
EOSINOPHILS ABSOLUTE: 0.11 E9/L (ref 0.05–0.5)
EOSINOPHILS RELATIVE PERCENT: 1.4 % (ref 0–6)
EPITHELIAL CELLS, UA: ABNORMAL /HPF
GFR AFRICAN AMERICAN: >60
GFR NON-AFRICAN AMERICAN: >60 ML/MIN/1.73
GLUCOSE BLD-MCNC: 106 MG/DL (ref 74–99)
GLUCOSE URINE: NEGATIVE MG/DL
HCT VFR BLD CALC: 33.4 % (ref 34–48)
HEMOGLOBIN: 10.3 G/DL (ref 11.5–15.5)
IMMATURE GRANULOCYTES #: 0.04 E9/L
IMMATURE GRANULOCYTES %: 0.5 % (ref 0–5)
INR BLD: 1.3
KETONES, URINE: NEGATIVE MG/DL
LEUKOCYTE ESTERASE, URINE: NEGATIVE
LYMPHOCYTES ABSOLUTE: 2.13 E9/L (ref 1.5–4)
LYMPHOCYTES RELATIVE PERCENT: 28 % (ref 20–42)
MCH RBC QN AUTO: 27.6 PG (ref 26–35)
MCHC RBC AUTO-ENTMCNC: 30.8 % (ref 32–34.5)
MCV RBC AUTO: 89.5 FL (ref 80–99.9)
MONOCYTES ABSOLUTE: 0.6 E9/L (ref 0.1–0.95)
MONOCYTES RELATIVE PERCENT: 7.9 % (ref 2–12)
NEUTROPHILS ABSOLUTE: 4.7 E9/L (ref 1.8–7.3)
NEUTROPHILS RELATIVE PERCENT: 61.8 % (ref 43–80)
NITRITE, URINE: NEGATIVE
PDW BLD-RTO: 15 FL (ref 11.5–15)
PH UA: 6.5 (ref 5–9)
PLATELET # BLD: 563 E9/L (ref 130–450)
PMV BLD AUTO: 9.3 FL (ref 7–12)
POTASSIUM REFLEX MAGNESIUM: 4.8 MMOL/L (ref 3.5–5)
PROCALCITONIN: 0.06 NG/ML (ref 0–0.08)
PROTEIN UA: NEGATIVE MG/DL
PROTHROMBIN TIME: 15.3 SEC (ref 9.3–12.4)
RBC # BLD: 3.73 E12/L (ref 3.5–5.5)
RBC UA: ABNORMAL /HPF (ref 0–2)
SODIUM BLD-SCNC: 138 MMOL/L (ref 132–146)
SPECIFIC GRAVITY UA: <=1.005 (ref 1–1.03)
UROBILINOGEN, URINE: 0.2 E.U./DL
WBC # BLD: 7.6 E9/L (ref 4.5–11.5)
WBC UA: ABNORMAL /HPF (ref 0–5)

## 2019-04-30 PROCEDURE — 74176 CT ABD & PELVIS W/O CONTRAST: CPT

## 2019-04-30 PROCEDURE — 2580000003 HC RX 258: Performed by: FAMILY MEDICINE

## 2019-04-30 PROCEDURE — 85025 COMPLETE CBC W/AUTO DIFF WBC: CPT

## 2019-04-30 PROCEDURE — 85610 PROTHROMBIN TIME: CPT

## 2019-04-30 PROCEDURE — 93005 ELECTROCARDIOGRAM TRACING: CPT | Performed by: EMERGENCY MEDICINE

## 2019-04-30 PROCEDURE — 81001 URINALYSIS AUTO W/SCOPE: CPT

## 2019-04-30 PROCEDURE — 6370000000 HC RX 637 (ALT 250 FOR IP): Performed by: EMERGENCY MEDICINE

## 2019-04-30 PROCEDURE — 36415 COLL VENOUS BLD VENIPUNCTURE: CPT

## 2019-04-30 PROCEDURE — 2060000000 HC ICU INTERMEDIATE R&B

## 2019-04-30 PROCEDURE — 99285 EMERGENCY DEPT VISIT HI MDM: CPT

## 2019-04-30 PROCEDURE — 93010 ELECTROCARDIOGRAM REPORT: CPT | Performed by: INTERNAL MEDICINE

## 2019-04-30 PROCEDURE — 80048 BASIC METABOLIC PNL TOTAL CA: CPT

## 2019-04-30 PROCEDURE — 96374 THER/PROPH/DIAG INJ IV PUSH: CPT

## 2019-04-30 PROCEDURE — 6370000000 HC RX 637 (ALT 250 FOR IP): Performed by: FAMILY MEDICINE

## 2019-04-30 PROCEDURE — 84145 PROCALCITONIN (PCT): CPT

## 2019-04-30 RX ORDER — ALENDRONATE SODIUM 70 MG/1
70 TABLET ORAL
COMMUNITY

## 2019-04-30 RX ORDER — ONDANSETRON 2 MG/ML
4 INJECTION INTRAMUSCULAR; INTRAVENOUS EVERY 6 HOURS PRN
Status: DISCONTINUED | OUTPATIENT
Start: 2019-04-30 | End: 2019-05-02 | Stop reason: HOSPADM

## 2019-04-30 RX ORDER — SODIUM CHLORIDE 0.9 % (FLUSH) 0.9 %
10 SYRINGE (ML) INJECTION EVERY 12 HOURS SCHEDULED
Status: DISCONTINUED | OUTPATIENT
Start: 2019-04-30 | End: 2019-05-02 | Stop reason: HOSPADM

## 2019-04-30 RX ORDER — ALENDRONATE SODIUM 70 MG/1
70 TABLET ORAL
Status: DISCONTINUED | OUTPATIENT
Start: 2019-04-30 | End: 2019-04-30 | Stop reason: DRUGHIGH

## 2019-04-30 RX ORDER — IBUPROFEN 600 MG/1
600 TABLET ORAL ONCE
Status: COMPLETED | OUTPATIENT
Start: 2019-04-30 | End: 2019-04-30

## 2019-04-30 RX ORDER — SODIUM CHLORIDE 0.9 % (FLUSH) 0.9 %
10 SYRINGE (ML) INJECTION PRN
Status: DISCONTINUED | OUTPATIENT
Start: 2019-04-30 | End: 2019-04-30

## 2019-04-30 RX ORDER — OYSTER SHELL CALCIUM WITH VITAMIN D 500; 200 MG/1; [IU]/1
1 TABLET, FILM COATED ORAL 2 TIMES DAILY
Status: DISCONTINUED | OUTPATIENT
Start: 2019-04-30 | End: 2019-05-02 | Stop reason: HOSPADM

## 2019-04-30 RX ORDER — KETOROLAC TROMETHAMINE 30 MG/ML
15 INJECTION, SOLUTION INTRAMUSCULAR; INTRAVENOUS ONCE
Status: DISCONTINUED | OUTPATIENT
Start: 2019-04-30 | End: 2019-04-30

## 2019-04-30 RX ORDER — ACETAMINOPHEN 325 MG/1
650 TABLET ORAL EVERY 6 HOURS PRN
Status: DISCONTINUED | OUTPATIENT
Start: 2019-04-30 | End: 2019-05-01

## 2019-04-30 RX ORDER — ACETAMINOPHEN 325 MG/1
650 TABLET ORAL EVERY 4 HOURS PRN
Status: DISCONTINUED | OUTPATIENT
Start: 2019-04-30 | End: 2019-04-30

## 2019-04-30 RX ORDER — ROSUVASTATIN CALCIUM 20 MG/1
20 TABLET, COATED ORAL
Status: DISCONTINUED | OUTPATIENT
Start: 2019-05-01 | End: 2019-05-02 | Stop reason: HOSPADM

## 2019-04-30 RX ORDER — SODIUM CHLORIDE 0.9 % (FLUSH) 0.9 %
10 SYRINGE (ML) INJECTION PRN
Status: DISCONTINUED | OUTPATIENT
Start: 2019-04-30 | End: 2019-05-02 | Stop reason: HOSPADM

## 2019-04-30 RX ORDER — SODIUM CHLORIDE 0.9 % (FLUSH) 0.9 %
10 SYRINGE (ML) INJECTION EVERY 12 HOURS SCHEDULED
Status: DISCONTINUED | OUTPATIENT
Start: 2019-04-30 | End: 2019-04-30 | Stop reason: SDUPTHER

## 2019-04-30 RX ADMIN — IBUPROFEN 600 MG: 600 TABLET ORAL at 10:39

## 2019-04-30 RX ADMIN — Medication 10 ML: at 20:06

## 2019-04-30 RX ADMIN — ACETAMINOPHEN 650 MG: 325 TABLET ORAL at 18:17

## 2019-04-30 RX ADMIN — Medication 1 TABLET: at 20:05

## 2019-04-30 RX ADMIN — Medication 10 ML: at 20:14

## 2019-04-30 ASSESSMENT — PAIN DESCRIPTION - FREQUENCY
FREQUENCY: INTERMITTENT
FREQUENCY: INTERMITTENT

## 2019-04-30 ASSESSMENT — PAIN DESCRIPTION - PAIN TYPE
TYPE: ACUTE PAIN
TYPE: ACUTE PAIN

## 2019-04-30 ASSESSMENT — PAIN SCALES - GENERAL
PAINLEVEL_OUTOF10: 8
PAINLEVEL_OUTOF10: 2
PAINLEVEL_OUTOF10: 2
PAINLEVEL_OUTOF10: 8
PAINLEVEL_OUTOF10: 5
PAINLEVEL_OUTOF10: 0

## 2019-04-30 ASSESSMENT — PAIN - FUNCTIONAL ASSESSMENT: PAIN_FUNCTIONAL_ASSESSMENT: ACTIVITIES ARE NOT PREVENTED

## 2019-04-30 ASSESSMENT — PAIN DESCRIPTION - ONSET: ONSET: ON-GOING

## 2019-04-30 ASSESSMENT — PAIN DESCRIPTION - LOCATION
LOCATION: ABDOMEN
LOCATION: ABDOMEN

## 2019-04-30 ASSESSMENT — PAIN DESCRIPTION - ORIENTATION
ORIENTATION: LEFT
ORIENTATION: LEFT

## 2019-04-30 ASSESSMENT — PAIN DESCRIPTION - PROGRESSION: CLINICAL_PROGRESSION: NOT CHANGED

## 2019-04-30 ASSESSMENT — PAIN DESCRIPTION - DESCRIPTORS: DESCRIPTORS: PATIENT UNABLE TO DESCRIBE;SHARP

## 2019-04-30 NOTE — PROGRESS NOTES
Dr Nirmala Saenz notified of consult  Dr Juana Dasilva notified of admission and need for orders    [facility]

## 2019-04-30 NOTE — ED PROVIDER NOTES
HPI:  4/30/19, Time: 8:27 AM         Tigre Alvarado is a 59 y.o. female presenting to the ED for right flank pain beginning last night. She describes it as a sharp pain in her right lower back radiating around her side. Pain is worse with movement. Pain is not worsened by breathing and is not pleuritic. She has never had pain like this before. She denies history of kidney stones. She denies fevers, chest pain, shortness of breath, abdominal pain, nausea, vomiting, diarrhea, constipation, hematuria, and dysuria. Patient had a recent admission to the hospital for pericardial effusion, and she subsequently underwent a pericardial window. States that her postoperative period has been uncomplicated, and she has been doing well. Denies history of blood clots. She is not on anticoagulation. Review of Systems:   Pertinent positives and negatives are stated within HPI, all other systems reviewed and are negative.          --------------------------------------------- PAST HISTORY ---------------------------------------------  Past Medical History:  has a past medical history of Hyperlipidemia and Osteoporosis. Past Surgical History:  has a past surgical history that includes cyst removal; Tonsillectomy; Leg Surgery; and Pericardium surgery (N/A, 4/8/2019). Social History:  reports that she has never smoked. She has never used smokeless tobacco. She reports that she drinks alcohol. She reports that she does not use drugs. Family History: family history includes Diabetes in her father; High Blood Pressure in her brother; Kidney Disease in her mother. The patients home medications have been reviewed.     Allergies: Penicillins    -------------------------------------------------- RESULTS -------------------------------------------------  All laboratory and radiology results have been personally reviewed by myself   LABS:  Results for orders placed or performed during the hospital encounter of 04/30/19   CBC Auto Ventricular Rate 109 BPM    Atrial Rate 109 BPM    P-R Interval 138 ms    QRS Duration 74 ms    Q-T Interval 340 ms    QTc Calculation (Bazett) 457 ms    P Axis 40 degrees    R Axis 2 degrees    T Axis 39 degrees       RADIOLOGY:  Interpreted by Radiologist.  CT ABDOMEN PELVIS WO CONTRAST Additional Contrast? None   Final Result   ALERT:  THIS IS AN ABNORMAL REPORT   1. Extensively calcified uterus suggestive of multiple uterine   fibroids although other etiologies are not excluded. Correlation with   dedicated pelvic ultrasound recommended. 2. Nonobstructing left renal calculus. 3. Left lower lobe collapse, left pleural effusion with suspected   concurrent left basilar infiltrate/pneumonia. 4. Pericardial effusion with estimated transverse dimension of   approximately 0.81 cm. 5. Cholelithiasis.          ------------------------- NURSING NOTES AND VITALS REVIEWED ---------------------------   The nursing notes within the ED encounter and vital signs as below have been reviewed. /70   Pulse 102   Temp 97.5 °F (36.4 °C) (Oral)   Resp 16   Ht 4' 9\" (1.448 m)   Wt 115 lb (52.2 kg)   SpO2 98%   BMI 24.89 kg/m²   Oxygen Saturation Interpretation: Normal      ---------------------------------------------------PHYSICAL EXAM--------------------------------------      Constitutional/General: Alert and oriented x3, well appearing, non toxic in NAD  Head: Normocephalic and atraumatic  Eyes: PERRL, EOMI  Mouth: Oropharynx clear, handling secretions, no trismus  Neck: Supple, full ROM,   Pulmonary: Lungs clear to auscultation bilaterally, no wheezes, rales, or rhonchi. Not in respiratory distress  Cardiovascular:  Tachycardic. Regular rhythm, no murmurs, gallops, or rubs. 2+ distal pulses  Chest: Well-healing sternotomy incision, clean and intact, no erythema, no active discharge  Abdomen: Soft, non tender, non distended,   Back: No midline thoracic or lumbar tenderness.  Mild tenderness to right lower paraspinal lumbar muscles. No CVA tenderness bilaterally. Extremities: Moves all extremities x 4. Warm and well perfused  Skin: warm and dry without rash  Neurologic: GCS 15,  Psych: Normal Affect    EKG: This EKG is signed and interpreted by me. Rate: 109  Rhythm: Sinus  Interpretation: no acute changes  Comparison: no previous EKG available    ------------------------------ ED COURSE/MEDICAL DECISION MAKING----------------------  Medications   calcium-vitamin D (OSCAL-500) 500-200 MG-UNIT per tablet 1 tablet (1 tablet Oral Not Given 4/30/19 1539)   acetaminophen (TYLENOL) tablet 650 mg (has no administration in time range)   vitamin D tablet 5,000 Units (5,000 Units Oral Not Given 4/30/19 1539)   rosuvastatin (CRESTOR) tablet 20 mg (has no administration in time range)   sodium chloride flush 0.9 % injection 10 mL (has no administration in time range)   sodium chloride flush 0.9 % injection 10 mL (has no administration in time range)   magnesium hydroxide (MILK OF MAGNESIA) 400 MG/5ML suspension 30 mL (has no administration in time range)   ondansetron (ZOFRAN) injection 4 mg (has no administration in time range)   enoxaparin (LOVENOX) injection 40 mg (40 mg Subcutaneous Not Given 4/30/19 1538)   ibuprofen (ADVIL;MOTRIN) tablet 600 mg (600 mg Oral Given 4/30/19 1039)     Medical Decision Making:    Patient is a 59-year-old female presenting with right flank pain. In the emergency department, she was tachycardic but otherwise hemodynamically stable and afebrile. She was nontoxic on evaluation. She had some tenderness to her right lumbar paraspinal muscles. Abdomen was benign without focal tenderness. She was administered motrin for pain control. No acute findings on labs. Patient has worsening left pleural effusion. Pericardial effusion has decreased in size since pericardial window. She is saturating well on room air. Vitals are stable. Will be admitted for further management of pleural effusion.     ED COURSE:  ED Course as of Apr 30 1634   Tue Apr 30, 2019   1114 I discussed the patient's CT findings including worsening left pleural effusion as well as possible uterine fibroids. I recommended outpatient follow-up with ObGyn for nonemergent pelvic ultrasound. I have placed a call to pulmonology to discuss further plan for pleural effusion. [JA]   1132 I spoke with Dr. Joao Hinds with pulm. He recommended admission for thora. Will place call to Dr. Andrew Tom. [JA]   1139 I spoke with Dr. Andrew Tom. He will admit. [JA]      ED Course User Index  [JA] Isak Leger MD         Counseling: The emergency provider has spoken with the patient and family member patient and spouse and discussed todays results, in addition to providing specific details for the plan of care and counseling regarding the diagnosis and prognosis. Questions are answered at this time and they are agreeable with the plan.      --------------------------------- IMPRESSION AND DISPOSITION ---------------------------------    IMPRESSION  1. Recurrent left pleural effusion        DISPOSITION  Disposition: Admit to telemetry  Patient condition is stable      NOTE: This report was transcribed using voice recognition software.  Every effort was made to ensure accuracy; however, inadvertent computerized transcription errors may be present       Isak Leger MD  04/30/19 0435

## 2019-04-30 NOTE — PROGRESS NOTES
Aury Castellon M.D.,[facility]  Roxanna Chen D.O., F.A.C.O.I., Stephany Garcia M.D. Marizol Harkins M.D., Elder Benton M.D. Patient:  Katy Ruffin 59 y.o. female MRN: [id number]     Date of Service: 4/30/2019      PULMONARY CONSULTATION    Reason for Consultation: recurrent left pleural effusion  Referring Physician: Dr. Elaine Zavaleta with the referring physician will be sent via the electronic medical record. Chief Complaint: left upper quadrant  pain , left back pain     CODE STATUS:full     SUBJECTIVE:  HPI:    Katy Ruffin is a 58 yo female patient who presented to ED today after sudden onset of left upper quadrant pain that started overnight . Recent history of pericardial effusion with subxiphoid pericardial window on 4/8/2019 buy Dr. Loyd Roger , etiology elusive . Pulmonary has been consulted for recurrent pleural effusion. She is known to our service and was seen during last hospitalization , had improving chest x-ray and had  no thoracentesis at that time . She reports no SOB or pain post hospitilation , was to follow up in office with Dr. Arnoldo Vazquez on May 23rd. She reports that she was feeling well and had follow up last week with Dr. Loyd Roger . Last night sudden onset of left upper quadrant pain with radiation upward to side of chest , pain was relieved by nothing, described as stabbing and burning  . She denies fevers, chest pain, shortness of breath, abdominal pain, nausea, vomiting, diarrhea, constipation, hematuria, and dysuria.         Past Medical History:   Diagnosis Date    Hyperlipidemia     Osteoporosis        Past Surgical History:   Procedure Laterality Date    CYST REMOVAL      L leg    LEG SURGERY      fx repair    PERICARDIUM SURGERY N/A 4/8/2019    PERICARDIOCENTESIS POSSIBLE PERICARDIAL WINDOW CREATION performed by Keeley West MD at [facility]         Family History   Problem Relation Age of Onset    Diabetes Father     Kidney Disease Mother     High Blood Pressure Brother        Social History:  Smoking history: The patient is a never smoker   ETOH:   reports that she drinks alcohol. Exposures: There  is not history of TB or TB exposure. There is not asbestos or silica dust exposure. The patient reports does not have coal, foundry, quarry or [employer] exposure. Recent travel history none . There is not  history of recreational or IV drug use. There is not hot tub exposure. The patient does not have any exotic pets, turtles or exotic birds. Home Meds: Medications Prior to Admission: alendronate (FOSAMAX) 70 MG tablet, Take 70 mg by mouth every 7 days Indications: Saturday  acetaminophen (TYLENOL) 325 MG tablet, Take 2 tablets by mouth every 6 hours (Patient taking differently: Take 650 mg by mouth every 6 hours as needed for Pain )  rosuvastatin (CRESTOR) 20 MG tablet, Take 20 mg by mouth daily (with breakfast)   Cholecalciferol (VITAMIN D3) 5000 units TABS, Take 1 tablet by mouth daily  calcium carbonate-vitamin D (RA CALCIUM PLUS VITAMIN D) 600-200 MG-UNIT TABS, Take 1 tablet by mouth 2 times daily  ketotifen (ZADITOR) 0.025 % ophthalmic solution, Place 1 drop into both eyes 2 times daily as needed     CURRENT MEDS :  Scheduled Meds:   calcium-vitamin D  1 tablet Oral BID    vitamin D  5,000 Units Oral Daily    [START ON 5/1/2019] rosuvastatin  20 mg Oral Daily with breakfast    sodium chloride flush  10 mL Intravenous 2 times per day    enoxaparin  40 mg Subcutaneous Daily       Continuous Infusions: Allergies   Allergen Reactions    Penicillins Rash       REVIEW OF SYSTEMS:  Constitutional: Denies fever, weight loss, night sweats, and fatigue  Skin: Denies pigmentation, dark lesions, and rashes   HEENT: Denies hearing loss, tinnitus, ear drainage, epistaxis, sore throat, and hoarseness. Cardiovascular: Denies palpitations, chest pain, and chest pressure.   Respiratory: Denies cough, dyspnea at rest, hemoptysis, apnea, and choking. Gastrointestinal: Denies nausea, vomiting, poor appetite, diarrhea, heartburn or reflux  Genitourinary: Denies dysuria, frequency, urgency or hematuria  Musculoskeletal: Denies myalgias, muscle weakness, and bone pain,+ left back and abdominal pain   Neurological: Denies dizziness, vertigo, headache, and focal weakness  Psychological: Denies anxiety and depression  Endocrine: Denies heat intolerance and cold intolerance  Hematopoietic/Lymphatic: Denies bleeding problems and blood transfusions    OBJECTIVE:   /70   Pulse 102   Temp 97.5 °F (36.4 °C) (Oral)   Resp 16   Ht 4' 9\" (1.448 m)   Wt 115 lb (52.2 kg)   SpO2 98%   BMI 24.89 kg/m²   SpO2 Readings from Last 1 Encounters:   04/30/19 98%        I/O:  No intake or output data in the 24 hours ending 04/30/19 1429                   Physical Exam:  General: The patient is lying in bed comfortably without any distress. Breathing is not labored  HEENT: Pupils are equal round and reactive to light, there are no oral lesions and no post-nasal drip   Neck: supple without adenopathy  Cardiovascular: regular rate and rhythm without murmur or gallop  Respiratory: Clear to auscultation bilaterally without wheezing or crackles, very diminished on left side  Air entry is symmetric  Abdomen: soft, non-tender, non-distended, normal bowel sounds  Extremities: warm, no edema, no clubbing  Skin: no rash or lesion  Neurologic: CN II-XII grossly intact, no focal deficits  Imaging personally reviewed:      4/30 CT of abd/pelivs   Impression   ALERT:  THIS IS AN ABNORMAL REPORT   1. Extensively calcified uterus suggestive of multiple uterine   fibroids although other etiologies are not excluded. Correlation with   dedicated pelvic ultrasound recommended. 2. Nonobstructing left renal calculus. 3. Left lower lobe collapse, left pleural effusion with suspected   concurrent left basilar infiltrate/pneumonia.    4. Pericardial effusion with estimated transverse dimension of   approximately 0.81 cm. 5. Cholelithiasis. CTA chest  4/6/2019        Echo:  4/6/2019  Moderate to large circumferential pericardial effusion (1.5cm - 1.8cm),   without significant tamponade (no RV free wall or right atrial wall   diastolic collapse; IVC not dilated)   Left ventricular size is normal.   Normal LV segmental wall motion, EF 60%.   Left atrium is of normal size. Interatrial septum not well visualized but   appears intact.   Normal right ventricle structure and function.   Normal right atrium.   Normal mitral valve structure and function.   No mitral valve prolapse.   There is trace tricuspid regurgitation.   Mild pulmonary hypertension, RVSP 40mmHg.   The pulmonic valve was not well visualized.   Normal aortic root and ascending aorta.   Small echogenic masses noted in the pericardial space.   No intracardiac mass.   No recent study to compare.      Signature    Labs:  Lab Results   Component Value Date    WBC 7.6 04/30/2019    HGB 10.3 04/30/2019    HCT 33.4 04/30/2019    MCV 89.5 04/30/2019    MCH 27.6 04/30/2019    MCHC 30.8 04/30/2019    RDW 15.0 04/30/2019     04/30/2019    MPV 9.3 04/30/2019     Lab Results   Component Value Date     04/30/2019    K 4.8 04/30/2019     04/30/2019    CO2 22 04/30/2019    BUN 11 04/30/2019    CREATININE 0.7 04/30/2019    LABALBU 2.9 04/09/2019    CALCIUM 9.7 04/30/2019    GFRAA >60 04/30/2019    LABGLOM >60 04/30/2019     Lab Results   Component Value Date    PROTIME 16.4 04/06/2019    INR 1.5 04/06/2019     No results for input(s): PROBNP in the last 72 hours. No results for input(s): TROPONINI in the last 72 hours. No results for input(s): PROCAL in the last 72 hours. This SmartLink has not been configured with any valid records. Assessment:  1. Recurrent pleural effusion  left increased   , improved right pleural effusion   2. Left upper quadrant pain , left back pain   3.  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 Pericardial effusion s/p pericardial window       Plan:  1. Will have left thoracentesis today in IR , send for pleural fluid studies , cytology   2. Hold Lovenox   3. Draw pro calcitonin , INR , am labs CBC with diff and BMP, hx of transaminitis get liver profile   4. Add incentive spirometry       Thank you for allowing me to participate in the care of Yo Soni. Please feel free to call with questions. This plan of care was reviewed in collaboration with Dr. Bill Winn     Electronically signed by JAHAIRA Hollingsworth Junior on 4/30/2019 at 2:29 PM    I personally saw, examined, and cared for the patient. Labs, medications, radiographs reviewed. I agree with history exam and plans detailed in NP note with the following additions:    Admitted to the hospital with left sided chest discomfort. She recently was admitted to the hospital earlier this month with a pericardial effusion s/p pericardial window. She did have a left pleural effusion at that time, however, this was not drained. Upon arrival she had a persistent L effusion and underwent IR thoracentesis with excellent results. Pleural fluid analysis will be sent. The etiology of her pericardial fluid is not clear - cytology, infectious, and autoimmune etiologies negative. Will need a repeat CXR in 2 weeks with follow up in our office.     Electronically signed by Carl Velez MD on 5/1/2019 at 5:42 PM

## 2019-04-30 NOTE — PROGRESS NOTES
Database complete. Medications reconciled. Care plans and education initiated. Has appointment to see Dr. Angelique Caputo in May.

## 2019-05-01 ENCOUNTER — APPOINTMENT (OUTPATIENT)
Dept: GENERAL RADIOLOGY | Age: 64
DRG: 187 | End: 2019-05-01
Payer: COMMERCIAL

## 2019-05-01 ENCOUNTER — APPOINTMENT (OUTPATIENT)
Dept: ULTRASOUND IMAGING | Age: 64
DRG: 187 | End: 2019-05-01
Payer: COMMERCIAL

## 2019-05-01 LAB
ALP BLD-CCNC: 95 U/L (ref 35–104)
ALT SERPL-CCNC: 20 U/L (ref 0–32)
AMYLASE: 39 U/L (ref 20–100)
ANION GAP SERPL CALCULATED.3IONS-SCNC: 13 MMOL/L (ref 7–16)
APPEARANCE FLUID: NORMAL
AST SERPL-CCNC: 14 U/L (ref 0–31)
BASOPHILS ABSOLUTE: 0.05 E9/L (ref 0–0.2)
BASOPHILS RELATIVE PERCENT: 0.7 % (ref 0–2)
BILIRUBIN DIRECT: <0.2 MG/DL (ref 0–0.3)
BUN BLDV-MCNC: 19 MG/DL (ref 8–23)
CALCIUM SERPL-MCNC: 9.6 MG/DL (ref 8.6–10.2)
CELL COUNT FLUID TYPE: NORMAL
CHLORIDE BLD-SCNC: 105 MMOL/L (ref 98–107)
CO2: 21 MMOL/L (ref 22–29)
COLOR FLUID: NORMAL
CREAT SERPL-MCNC: 0.8 MG/DL (ref 0.5–1)
CRITICAL: ABNORMAL
DATE ANALYZED: ABNORMAL
DATE OF COLLECTION: ABNORMAL
EOSINOPHILS ABSOLUTE: 0.3 E9/L (ref 0.05–0.5)
EOSINOPHILS RELATIVE PERCENT: 4.4 % (ref 0–6)
FLUID TYPE: NORMAL
GFR AFRICAN AMERICAN: >60
GFR NON-AFRICAN AMERICAN: >60 ML/MIN/1.73
GLUCOSE BLD-MCNC: 100 MG/DL (ref 74–99)
GLUCOSE, FLUID: 97 MG/DL
HCT VFR BLD CALC: 32.2 % (ref 34–48)
HEMOGLOBIN: 9.8 G/DL (ref 11.5–15.5)
IMMATURE GRANULOCYTES #: 0.04 E9/L
IMMATURE GRANULOCYTES %: 0.6 % (ref 0–5)
LAB: ABNORMAL
LD, FLUID: 255 U/L
LIPASE: 36 U/L (ref 13–60)
LYMPHOCYTES ABSOLUTE: 2.62 E9/L (ref 1.5–4)
LYMPHOCYTES RELATIVE PERCENT: 38.7 % (ref 20–42)
Lab: ABNORMAL
Lab: ABNORMAL
MCH RBC QN AUTO: 27.2 PG (ref 26–35)
MCHC RBC AUTO-ENTMCNC: 30.4 % (ref 32–34.5)
MCV RBC AUTO: 89.4 FL (ref 80–99.9)
MONOCYTE, FLUID: 75 %
MONOCYTES ABSOLUTE: 0.61 E9/L (ref 0.1–0.95)
MONOCYTES RELATIVE PERCENT: 9 % (ref 2–12)
NEUTROPHIL, FLUID: 25 %
NEUTROPHILS ABSOLUTE: 3.15 E9/L (ref 1.8–7.3)
NEUTROPHILS RELATIVE PERCENT: 46.6 % (ref 43–80)
NUCLEATED CELLS FLUID: 2771 /UL
OPERATOR ID: ABNORMAL
PDW BLD-RTO: 15.1 FL (ref 11.5–15)
PH FLUID: ABNORMAL
PLATELET # BLD: 509 E9/L (ref 130–450)
PMV BLD AUTO: 9.2 FL (ref 7–12)
POTASSIUM SERPL-SCNC: 4.6 MMOL/L (ref 3.5–5)
PROTEIN FLUID: 5.5 G/DL
RBC # BLD: 3.6 E12/L (ref 3.5–5.5)
RBC FLUID: NORMAL /UL
SODIUM BLD-SCNC: 139 MMOL/L (ref 132–146)
SOURCE, BLOOD GAS: ABNORMAL
TIME ANALYZED: 1200
WBC # BLD: 6.8 E9/L (ref 4.5–11.5)

## 2019-05-01 PROCEDURE — 84450 TRANSFERASE (AST) (SGOT): CPT

## 2019-05-01 PROCEDURE — 36415 COLL VENOUS BLD VENIPUNCTURE: CPT

## 2019-05-01 PROCEDURE — 82947 ASSAY GLUCOSE BLOOD QUANT: CPT

## 2019-05-01 PROCEDURE — 87015 SPECIMEN INFECT AGNT CONCNTJ: CPT

## 2019-05-01 PROCEDURE — 2580000003 HC RX 258: Performed by: FAMILY MEDICINE

## 2019-05-01 PROCEDURE — 84157 ASSAY OF PROTEIN OTHER: CPT

## 2019-05-01 PROCEDURE — 87116 MYCOBACTERIA CULTURE: CPT

## 2019-05-01 PROCEDURE — 87102 FUNGUS ISOLATION CULTURE: CPT

## 2019-05-01 PROCEDURE — 87088 URINE BACTERIA CULTURE: CPT

## 2019-05-01 PROCEDURE — 6370000000 HC RX 637 (ALT 250 FOR IP): Performed by: FAMILY MEDICINE

## 2019-05-01 PROCEDURE — 84460 ALANINE AMINO (ALT) (SGPT): CPT

## 2019-05-01 PROCEDURE — 80048 BASIC METABOLIC PNL TOTAL CA: CPT

## 2019-05-01 PROCEDURE — 89051 BODY FLUID CELL COUNT: CPT

## 2019-05-01 PROCEDURE — 84075 ASSAY ALKALINE PHOSPHATASE: CPT

## 2019-05-01 PROCEDURE — C1729 CATH, DRAINAGE: HCPCS

## 2019-05-01 PROCEDURE — 87205 SMEAR GRAM STAIN: CPT

## 2019-05-01 PROCEDURE — 88305 TISSUE EXAM BY PATHOLOGIST: CPT

## 2019-05-01 PROCEDURE — 87206 SMEAR FLUORESCENT/ACID STAI: CPT

## 2019-05-01 PROCEDURE — 88112 CYTOPATH CELL ENHANCE TECH: CPT

## 2019-05-01 PROCEDURE — 83986 ASSAY PH BODY FLUID NOS: CPT

## 2019-05-01 PROCEDURE — 87186 SC STD MICRODIL/AGAR DIL: CPT

## 2019-05-01 PROCEDURE — 83615 LACTATE (LD) (LDH) ENZYME: CPT

## 2019-05-01 PROCEDURE — 85025 COMPLETE CBC W/AUTO DIFF WBC: CPT

## 2019-05-01 PROCEDURE — 87070 CULTURE OTHR SPECIMN AEROBIC: CPT

## 2019-05-01 PROCEDURE — 2060000000 HC ICU INTERMEDIATE R&B

## 2019-05-01 PROCEDURE — 82248 BILIRUBIN DIRECT: CPT

## 2019-05-01 PROCEDURE — 82150 ASSAY OF AMYLASE: CPT

## 2019-05-01 PROCEDURE — 83690 ASSAY OF LIPASE: CPT

## 2019-05-01 PROCEDURE — 0W9B3ZX DRAINAGE OF LEFT PLEURAL CAVITY, PERCUTANEOUS APPROACH, DIAGNOSTIC: ICD-10-PCS | Performed by: RADIOLOGY

## 2019-05-01 PROCEDURE — 71045 X-RAY EXAM CHEST 1 VIEW: CPT

## 2019-05-01 RX ORDER — HYDROCODONE BITARTRATE AND ACETAMINOPHEN 5; 325 MG/1; MG/1
1 TABLET ORAL EVERY 4 HOURS PRN
Status: DISCONTINUED | OUTPATIENT
Start: 2019-05-01 | End: 2019-05-02 | Stop reason: HOSPADM

## 2019-05-01 RX ORDER — ACETAMINOPHEN 325 MG/1
650 TABLET ORAL EVERY 6 HOURS PRN
Status: DISCONTINUED | OUTPATIENT
Start: 2019-05-01 | End: 2019-05-02 | Stop reason: HOSPADM

## 2019-05-01 RX ADMIN — ROSUVASTATIN CALCIUM 20 MG: 20 TABLET, FILM COATED ORAL at 07:51

## 2019-05-01 RX ADMIN — ACETAMINOPHEN 650 MG: 325 TABLET ORAL at 03:59

## 2019-05-01 RX ADMIN — Medication 10 ML: at 20:18

## 2019-05-01 RX ADMIN — Medication 1 TABLET: at 20:18

## 2019-05-01 RX ADMIN — HYDROCODONE BITARTRATE AND ACETAMINOPHEN 1 TABLET: 5; 325 TABLET ORAL at 20:18

## 2019-05-01 RX ADMIN — HYDROCODONE BITARTRATE AND ACETAMINOPHEN 1 TABLET: 5; 325 TABLET ORAL at 09:12

## 2019-05-01 RX ADMIN — CHOLECALCIFEROL TAB 50 MCG (2000 UNIT) 5000 UNITS: 50 TAB at 07:51

## 2019-05-01 RX ADMIN — Medication 1 TABLET: at 07:52

## 2019-05-01 RX ADMIN — HYDROCODONE BITARTRATE AND ACETAMINOPHEN 1 TABLET: 5; 325 TABLET ORAL at 13:30

## 2019-05-01 ASSESSMENT — PAIN SCALES - GENERAL
PAINLEVEL_OUTOF10: 8
PAINLEVEL_OUTOF10: 0
PAINLEVEL_OUTOF10: 7
PAINLEVEL_OUTOF10: 8
PAINLEVEL_OUTOF10: 0
PAINLEVEL_OUTOF10: 7
PAINLEVEL_OUTOF10: 8

## 2019-05-01 ASSESSMENT — PAIN - FUNCTIONAL ASSESSMENT: PAIN_FUNCTIONAL_ASSESSMENT: PREVENTS OR INTERFERES SOME ACTIVE ACTIVITIES AND ADLS

## 2019-05-01 ASSESSMENT — PAIN DESCRIPTION - LOCATION
LOCATION: ABDOMEN;FLANK
LOCATION: ABDOMEN;FLANK

## 2019-05-01 ASSESSMENT — PAIN DESCRIPTION - PAIN TYPE
TYPE: ACUTE PAIN
TYPE: ACUTE PAIN

## 2019-05-01 ASSESSMENT — PAIN DESCRIPTION - PROGRESSION: CLINICAL_PROGRESSION: NOT CHANGED

## 2019-05-01 ASSESSMENT — PAIN DESCRIPTION - ORIENTATION
ORIENTATION: LEFT
ORIENTATION: LEFT

## 2019-05-01 ASSESSMENT — PAIN DESCRIPTION - DESCRIPTORS: DESCRIPTORS: DISCOMFORT;NAGGING;SORE

## 2019-05-01 ASSESSMENT — PAIN DESCRIPTION - FREQUENCY: FREQUENCY: INTERMITTENT

## 2019-05-01 NOTE — PROGRESS NOTES
Marilia Ramires M.D.,[facility]  Belkys Miranda D.O., F.A.C.O.I., Edwin Ying M.D. Jennifer Jiménez M.D., Anamaria Low M.D. Daily Pulmonary Progress Note    Patient:  Gayathri Steele 59 y.o. female MRN: [id number]     Date of Service: 5/1/2019      Synopsis     We are following patient for pleural effusions     \"CC\" cough     Code status:full       Subjective      Patient was seen and examined. Sheis up and walking around room on RA saturation 93% ,  denies SOB no cough still  with pain on left upper quad of abd. Discussed with patient ans  doug in IR today . No labored breathing today . Aswered all questions . Review of Systems:  Constitutional: Denies fever, weight loss, night sweats, and fatigue  Skin: Denies pigmentation, dark lesions, and rashes   HEENT: Denies hearing loss, tinnitus, ear drainage, epistaxis, sore throat, and hoarseness. Cardiovascular: Denies palpitations, chest pain, and chest pressure. Respiratory: Denies cough, dyspnea at rest, hemoptysis, apnea, and choking. Gastrointestinal: Denies nausea, vomiting, poor appetite, diarrhea, heartburn or reflux  24-hour events:  None     Objective   Vitals: /63   Pulse 95   Temp 97.8 °F (36.6 °C) (Oral)   Resp 16   Ht 4' 9\" (1.448 m)   Wt 115 lb 9.6 oz (52.4 kg)   SpO2 98%   BMI 25.02 kg/m²     I/O:    Intake/Output Summary (Last 24 hours) at 5/1/2019 0850  Last data filed at 5/1/2019 4222  Gross per 24 hour   Intake 120 ml   Output 450 ml   Net -330 ml                        CURRENT MEDS :  Scheduled Meds:   calcium-vitamin D  1 tablet Oral BID    vitamin D  5,000 Units Oral Daily    rosuvastatin  20 mg Oral Daily with breakfast    sodium chloride flush  10 mL Intravenous 2 times per day       Physical Exam:  General Appearance: appears comfortable in no acute distress.    HEENT: Normocephalic atraumatic without obvious abnormality   Neck: Lips, mucosa, and tongue normal.  Supple, symmetrical, trachea midline, no adenopathy;thyroid:  no enlargement/tenderness/nodules or JVD. Lung: Breath sounds CTA, diminished left . Respirations   unlabored. Symmetrical expansion. Heart: RRR, normal S1, S2. No MRG  Abdomen: Soft, NT, ND. BS present x 4 quadrants. No bruit or organomegaly. Extremities: Pedal pulses 2+ symmetric b/l. Extremities normal, no cyanosis, clubbing, or edema. Musculokeletal: No joint swelling, no muscle tenderness. ROM normal in all joints of extremities. Neurologic: Mental status: Alert and Oriented X3 . Pertinent/ New Labs and Imaging Studies     Labs:  Lab Results   Component Value Date    WBC 6.8 05/01/2019    HGB 9.8 05/01/2019    HCT 32.2 05/01/2019    MCV 89.4 05/01/2019    MCH 27.2 05/01/2019    MCHC 30.4 05/01/2019    RDW 15.1 05/01/2019     05/01/2019    MPV 9.2 05/01/2019     Lab Results   Component Value Date     05/01/2019    K 4.6 05/01/2019    K 4.8 04/30/2019     05/01/2019    CO2 21 05/01/2019    BUN 19 05/01/2019    CREATININE 0.8 05/01/2019    LABALBU 2.9 04/09/2019    CALCIUM 9.6 05/01/2019    GFRAA >60 05/01/2019    LABGLOM >60 05/01/2019     Lab Results   Component Value Date    PROTIME 15.3 04/30/2019    INR 1.3 04/30/2019     No results for input(s): PROBNP in the last 72 hours. Recent Labs     04/30/19  0903   PROCAL 0.06     This SmartLink has not been configured with any valid records. Assessment:    1. Recurrent pleural effusion  left increased   , improved right pleural effusion   2. Left upper quadrant pain , left back pain   3. 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 Will have left thoracentesis today in IR , send for pleural fluid studies , cytology   2. Hold Lovenox till thora   3. royer need f/u in offie in 1-2 weeks with cxr   4.  Add incentive spirometry        This plan of care was reviewed in collaboration with Dr. Radha Lieberman   Electronically signed by JAHAIRA Sr on 5/1/2019 at 8:50 AM    I personally saw, examined, and cared for the patient. Labs, medications, radiographs reviewed. I agree with history exam and plans detailed in NP note.     Electronically signed by Lm Draper MD on 5/1/2019 at 5:42 PM

## 2019-05-01 NOTE — PLAN OF CARE
Problem: Falls - Risk of:  Goal: Absence of falls  5/1/2019 0121 by Mikaela Alvares RN  Outcome: Met This Shift

## 2019-05-01 NOTE — H&P
History and Physical    CHIEF COMPLAINT: Left flank pain    HISTORY OF PRESENT ILLNESS:    The patient is a 59 y.o. female patient who presented to ED on the day of admission with left upper quadrant pain that started the previous night. Patient recently in the hospital for pericardial effusion which was thought to be viral in nature. Patient is status post treatment with pericardial window on 4/8/2019 by Dr. Paz Gordon. Pulmonary had been consulted for pleural effusion during that admission. Pleural effusions had been improving on chest x-ray and no thoracentesis was therefore performed at that time. Patient recovered and was discharged home in stable condition. Patient states that she has been feeling well until starting with left flank pain yesterday. In the ER patient was found to have increasing left-sided pleural effusion. She is now admitted for further evaluation and treatment. No fever, chills, nausea, vomiting, and/or diarrhea. Remainder of the infectious disease review of systems is negative. Patient denies headache, change in vision, chest pain, shortness of breath, palpitations, syncope, lower extremity edema, and or any neurological signs or symptoms.      Past Medical History:    Past Medical History:   Diagnosis Date    Hyperlipidemia     Osteoporosis      Past Surgical History:    Past Surgical History:   Procedure Laterality Date    CYST REMOVAL      L leg    LEG SURGERY      fx repair    PERICARDIUM SURGERY N/A 4/8/2019    PERICARDIOCENTESIS POSSIBLE PERICARDIAL WINDOW CREATION performed by Mena Moody MD at [facility]       Medications Prior to Admission:    Medications Prior to Admission: alendronate (FOSAMAX) 70 MG tablet, Take 70 mg by mouth every 7 days Indications: Saturday  acetaminophen (TYLENOL) 325 MG tablet, Take 2 tablets by mouth every 6 hours (Patient taking differently: Take 650 mg by mouth every 6 hours as needed for Pain )  rosuvastatin (CRESTOR) 20 MG tablet, Take orders. 4. Home when stable.     Nemo Singh  8:05 AM  5/1/2019

## 2019-05-01 NOTE — PLAN OF CARE
[facility] Quality Flow/Interdisciplinary Rounds Progress Note        Quality Flow Rounds held on May 1, 2019    Disciplines Attending:  Bedside Nurse, ,  and Nursing Unit Leadership    Raina Phelps was admitted on 4/30/2019  8:00 AM    Anticipated Discharge Date:  Expected Discharge Date: 05/03/19    Disposition:    Wally Score:  Wally Scale Score: 22    Readmission Risk              Risk of Unplanned Readmission:        10           Discussed patient goal for the day, patient clinical progression, and barriers to discharge.   The following Goal(s) of the Day/Commitment(s) have been identified:  Thoracentesis      Kim Bajwa  May 1, 2019

## 2019-05-02 VITALS
BODY MASS INDEX: 25.33 KG/M2 | OXYGEN SATURATION: 95 % | TEMPERATURE: 97.3 F | RESPIRATION RATE: 16 BRPM | HEART RATE: 98 BPM | DIASTOLIC BLOOD PRESSURE: 70 MMHG | WEIGHT: 117.4 LBS | HEIGHT: 57 IN | SYSTOLIC BLOOD PRESSURE: 116 MMHG

## 2019-05-02 LAB
ALBUMIN SERPL-MCNC: 3.5 G/DL (ref 3.5–5.2)
ALP BLD-CCNC: 89 U/L (ref 35–104)
ALT SERPL-CCNC: 16 U/L (ref 0–32)
ANION GAP SERPL CALCULATED.3IONS-SCNC: 12 MMOL/L (ref 7–16)
APPEARANCE FLUID: NORMAL
AST SERPL-CCNC: 14 U/L (ref 0–31)
BILIRUB SERPL-MCNC: 0.2 MG/DL (ref 0–1.2)
BUN BLDV-MCNC: 17 MG/DL (ref 8–23)
CALCIUM SERPL-MCNC: 9.8 MG/DL (ref 8.6–10.2)
CHLORIDE BLD-SCNC: 103 MMOL/L (ref 98–107)
CO2: 23 MMOL/L (ref 22–29)
CREAT SERPL-MCNC: 0.7 MG/DL (ref 0.5–1)
FLUID TYPE: NORMAL
GFR AFRICAN AMERICAN: >60
GFR NON-AFRICAN AMERICAN: >60 ML/MIN/1.73
GLUCOSE BLD-MCNC: 101 MG/DL (ref 74–99)
GRAM STAIN ORDERABLE: NORMAL
HCT VFR BLD CALC: 32 % (ref 34–48)
HEMOGLOBIN: 9.7 G/DL (ref 11.5–15.5)
MCH RBC QN AUTO: 27.2 PG (ref 26–35)
MCHC RBC AUTO-ENTMCNC: 30.3 % (ref 32–34.5)
MCV RBC AUTO: 89.6 FL (ref 80–99.9)
PDW BLD-RTO: 15.2 FL (ref 11.5–15)
PH, BODY FLUID: 7.52
PLATELET # BLD: 516 E9/L (ref 130–450)
PMV BLD AUTO: 9.3 FL (ref 7–12)
POTASSIUM SERPL-SCNC: 4.5 MMOL/L (ref 3.5–5)
RBC # BLD: 3.57 E12/L (ref 3.5–5.5)
SODIUM BLD-SCNC: 138 MMOL/L (ref 132–146)
TOTAL PROTEIN: 7 G/DL (ref 6.4–8.3)
WBC # BLD: 7.9 E9/L (ref 4.5–11.5)

## 2019-05-02 PROCEDURE — 85027 COMPLETE CBC AUTOMATED: CPT

## 2019-05-02 PROCEDURE — 2580000003 HC RX 258: Performed by: FAMILY MEDICINE

## 2019-05-02 PROCEDURE — 36415 COLL VENOUS BLD VENIPUNCTURE: CPT

## 2019-05-02 PROCEDURE — 80053 COMPREHEN METABOLIC PANEL: CPT

## 2019-05-02 PROCEDURE — 6370000000 HC RX 637 (ALT 250 FOR IP): Performed by: FAMILY MEDICINE

## 2019-05-02 RX ADMIN — ROSUVASTATIN CALCIUM 20 MG: 20 TABLET, FILM COATED ORAL at 08:46

## 2019-05-02 RX ADMIN — CHOLECALCIFEROL TAB 50 MCG (2000 UNIT) 5000 UNITS: 50 TAB at 10:26

## 2019-05-02 RX ADMIN — Medication 10 ML: at 08:46

## 2019-05-02 RX ADMIN — Medication 1 TABLET: at 08:46

## 2019-05-02 ASSESSMENT — PAIN SCALES - GENERAL: PAINLEVEL_OUTOF10: 0

## 2019-05-02 NOTE — PROGRESS NOTES
[facility] Quality Flow/Interdisciplinary Rounds Progress Note        Quality Flow Rounds held on May 2, 2019    Disciplines Attending:  Bedside Nurse, ,  and Nursing Unit Leadership    Rosina Welsh was admitted on 4/30/2019  8:00 AM    Anticipated Discharge Date:  Expected Discharge Date: 05/03/19    Disposition:    Wally Score:  Wally Scale Score: 22    Readmission Risk              Risk of Unplanned Readmission:        10           Discussed patient goal for the day, patient clinical progression, and barriers to discharge.   The following Goal(s) of the Day/Commitment(s) have been identified:  Other  possible [id number] [facility] 151  May 2, 2019

## 2019-05-02 NOTE — PROGRESS NOTES
Family Medicine    Subjective: The patient is doing well without complaints and denies problems overnight. Denies chest pain, angina, and dyspnea. Objective:  BP (!) 100/58   Pulse 91   Temp 97.7 °F (36.5 °C) (Oral)   Resp 16   Ht 4' 9\" (1.448 m)   Wt 117 lb 6.4 oz (53.3 kg)   SpO2 95%   BMI 25.41 kg/m²     HEENT: MMM  Heart:  RRR, no murmurs, gallops, or rubs. Lungs:  CTA bilaterally, no wheeze, rales or rhonchi. Abd: bowel sounds present, nontender, nondistended, no masses. Extrem:  No clubbing, cyanosis, or edema. Neuro: Intact without deficits. CBC with Differential:    Lab Results   Component Value Date    WBC 7.9 05/02/2019    RBC 3.57 05/02/2019    HGB 9.7 05/02/2019    HCT 32.0 05/02/2019     05/02/2019    MCV 89.6 05/02/2019    MCH 27.2 05/02/2019    MCHC 30.3 05/02/2019    RDW 15.2 05/02/2019    LYMPHOPCT 38.7 05/01/2019    MONOPCT 9.0 05/01/2019    BASOPCT 0.7 05/01/2019    MONOSABS 0.61 05/01/2019    LYMPHSABS 2.62 05/01/2019    EOSABS 0.30 05/01/2019    BASOSABS 0.05 05/01/2019     CMP:    Lab Results   Component Value Date     05/02/2019    K 4.5 05/02/2019    K 4.8 04/30/2019     05/02/2019    CO2 23 05/02/2019    BUN 17 05/02/2019    CREATININE 0.7 05/02/2019    GFRAA >60 05/02/2019    LABGLOM >60 05/02/2019    GLUCOSE 101 05/02/2019    PROT 7.0 05/02/2019    LABALBU 3.5 05/02/2019    CALCIUM 9.8 05/02/2019    BILITOT 0.2 05/02/2019    ALKPHOS 89 05/02/2019    AST 14 05/02/2019    ALT 16 05/02/2019     Assessment/Plan:  1. Left pleural effusion  2. Recent pericardial effusion - apparent viral  3. Hyperlipidemia  4. Osteoporosis  5. Vitamin D deficiency  6.  Disposition    Modesto Joya  8:06 AM  5/2/2019

## 2019-05-02 NOTE — PROGRESS NOTES
Margo Mathis M.D.,[facility]  Clinton Zavala D.O., FRADHAOSUSAN., Maryse Dawson M.D. Jonathan Mckeon M.D., Azeb Magana M.D. Daily Pulmonary Progress Note    Patient:  Philpip Clark 59 y.o. female MRN: [id number]     Date of Service: 5/2/2019      Synopsis   We are following patient for pleural effusions  thoracentesis May 1 left     \"CC\" cough      Code status:full         Subjective      Patient was seen and examined. Patient is seen in room sitting up in bed  is at bedside. Reviewed with her pulmonary plan of care and follow-up okay for discharge today if okay with others. Patient reports less pain from the left side today. No labored breathing no tachypnea she has been up and walking around room with no shortness of breath. Reviewed with patient at length extensive testing done on previous admission of pericardial fluid. Review of Systems:  Constitutional: Denies fever, weight loss, night sweats, and fatigue  Skin: Denies pigmentation, dark lesions, and rashes   HEENT: Denies hearing loss, tinnitus, ear drainage, epistaxis, sore throat, and hoarseness. Cardiovascular: Denies palpitations, chest pain, and chest pressure. Respiratory: Denies cough, dyspnea at rest, hemoptysis, apnea, and choking.   Gastrointestinal: Denies nausea, vomiting, poor appetite, diarrhea, heartburn or reflux  Genitourinary: Denies dysuria, frequency, urgency or hematuria    24-hour events:  Status post left thoracentesis    Objective   Vitals: /70   Pulse 98   Temp 97.3 °F (36.3 °C) (Oral)   Resp 16   Ht 4' 9\" (1.448 m)   Wt 117 lb 6.4 oz (53.3 kg)   SpO2 95%   BMI 25.41 kg/m²     I/O:    Intake/Output Summary (Last 24 hours) at 5/2/2019 1456  Last data filed at 5/1/2019 2047  Gross per 24 hour   Intake 240 ml   Output 200 ml   Net 40 ml                        CURRENT MEDS :  Scheduled Meds:   calcium-vitamin D  1 tablet Oral BID    vitamin D  5,000 Units Oral Daily   

## 2019-05-03 LAB
BODY FLUID CULTURE, STERILE: NORMAL
GRAM STAIN RESULT: NORMAL
ORGANISM: ABNORMAL
URINE CULTURE, ROUTINE: ABNORMAL
URINE CULTURE, ROUTINE: ABNORMAL

## 2019-05-13 LAB
FUNGUS (MYCOLOGY) CULTURE: NORMAL
FUNGUS STAIN: NORMAL

## 2019-05-27 ENCOUNTER — HOSPITAL ENCOUNTER (EMERGENCY)
Age: 64
Discharge: HOME | End: 2019-05-27
Payer: COMMERCIAL

## 2019-05-27 ENCOUNTER — APPOINTMENT (OUTPATIENT)
Dept: GENERAL RADIOLOGY | Age: 64
End: 2019-05-27
Payer: COMMERCIAL

## 2019-05-27 VITALS
OXYGEN SATURATION: 99 % | BODY MASS INDEX: 24.81 KG/M2 | DIASTOLIC BLOOD PRESSURE: 70 MMHG | TEMPERATURE: 99.2 F | HEART RATE: 86 BPM | SYSTOLIC BLOOD PRESSURE: 176 MMHG | WEIGHT: 115 LBS | HEIGHT: 57 IN | RESPIRATION RATE: 14 BRPM

## 2019-05-27 DIAGNOSIS — S60.222A CONTUSION OF LEFT HAND, INITIAL ENCOUNTER: Primary | ICD-10-CM

## 2019-05-27 PROCEDURE — 73130 X-RAY EXAM OF HAND: CPT

## 2019-05-27 PROCEDURE — 99283 EMERGENCY DEPT VISIT LOW MDM: CPT

## 2019-05-27 RX ORDER — IBUPROFEN 800 MG/1
800 TABLET ORAL EVERY 6 HOURS PRN
Qty: 16 TABLET | Refills: 0 | Status: SHIPPED | OUTPATIENT
Start: 2019-05-27 | End: 2020-09-03 | Stop reason: ALTCHOICE

## 2019-05-27 ASSESSMENT — PAIN SCALES - GENERAL: PAINLEVEL_OUTOF10: 6

## 2019-05-27 ASSESSMENT — PAIN DESCRIPTION - ORIENTATION: ORIENTATION: LEFT

## 2019-05-27 ASSESSMENT — PAIN DESCRIPTION - PAIN TYPE: TYPE: ACUTE PAIN

## 2019-05-27 ASSESSMENT — PAIN DESCRIPTION - LOCATION: LOCATION: HAND

## 2019-05-28 LAB
AFB CULTURE (MYCOBACTERIA): NORMAL
AFB SMEAR: NORMAL

## 2019-05-30 NOTE — ED PROVIDER NOTES
[facility]       Department of Emergency Medicine   ED  Provider Note  Admit Date/RoomTime: 5/27/2019  5:15 PM  ED Room: /  Chief Complaint   Hand Injury (pt states she fell the other day and now her left hand is hurting in metacarpal region. full motion and extremity usage. )    History of Present Illness   Source of history provided by:  patient. History/Exam Limitations: none. Daniel Peguero is a 59 y.o. old female presenting to the emergency department by private vehicle, for Left hand pain which occured several day(s) prior to arrival.  Cause of complaint: fall while at home. There has been a history of no prior problems with this area in the past.   She is right handed. The patients tetanus status is up to date. Since onset the symptoms have been mild in degree. Her pain is aggraveated by movement, use and palpation and relieved by nothing. ROS    Pertinent positives and negatives are stated within HPI, all other systems reviewed and are negative. Past Surgical History:  has a past surgical history that includes cyst removal; Tonsillectomy; Leg Surgery; and Pericardium surgery (N/A, 4/8/2019). Social History:  reports that she has never smoked. She has never used smokeless tobacco. She reports that she drinks alcohol. She reports that she does not use drugs. Family History: family history includes Diabetes in her father; High Blood Pressure in her brother; Kidney Disease in her mother. Allergies: Penicillins    Physical Exam           ED Triage Vitals [05/27/19 1714]   BP Temp Temp Source Pulse Resp SpO2 Height Weight   (!) 176/70 99.2 °F (37.3 °C) Oral 86 14 99 % 4' 9\" (1.448 m) 115 lb (52.2 kg)      Oxygen Saturation Interpretation: Normal.    Constitutional:  Alert, development consistent with age. Neck:  Normal ROM. Supple. Non-tender. Hand: Left dorsal & volar all proximal aspect  Proximal Phalanx and Metacarpal .              Tenderness: mild. Swelling: None. Deformity: no.               Skin:  tenderness and no erythema, rash or wounds noted. Neurovascular: Motor deficit: none. Sensory deficit:   none. Pulse deficit: none. Capillary refill: normal.    Wrist:               Tenderness:  none. Swelling: None. Deformity: no.             ROM: full range of motion. Skin:  no erythema, rash or wounds noted. Lymphatics: No lymphangitis or adenopathy noted. Neurological:  Oriented. Motor functions intact. t. Lab / Imaging Results   (All laboratory and radiology results have been personally reviewed by myself)  Labs:  No results found for this visit on 05/27/19. Imaging: All Radiology results interpreted by Radiologist unless otherwise noted. XR HAND LEFT (MIN 3 VIEWS)   Final Result      No acute osseous abnormality. ED Course / Medical Decision Making   Medications - No data to display         Consult(s):   None    Procedure(s):   none    MDM:    Films were obtained and are negative for fracture. Plan is subsequently for symptom control, limited use and  immobilization with appropriate outpatient follow-up. Counseling: The emergency provider has spoken with the patient and discussed todays results, in addition to providing specific details for the plan of care and counseling regarding the diagnosis and prognosis. Questions are answered at this time and they are agreeable with the plan. Assessment      1.  Contusion of left hand, initial encounter      Plan   Discharge to home  Patient condition is good    New Medications     Discharge Medication List as of 5/27/2019  5:44 PM      START taking these medications    Details   ibuprofen (ADVIL;MOTRIN) 800 MG tablet Take 1 tablet by mouth every 6 hours as needed for Pain, Disp-16 tablet, R-0Print           Electronically signed by JAHAIRA Price CNP   DD: 5/30/19  **This report was transcribed using voice recognition software. Every effort was made to ensure accuracy; however, inadvertent computerized transcription errors may be present.   END OF ED PROVIDER NOTE        Shane Reyez, JAHAIRA - HARRISON  05/30/19 [id number]

## 2019-05-30 NOTE — DISCHARGE SUMMARY
Patient admitted for observation and discharge within 48 hours. Please see hospital chart for specifics.

## 2019-06-03 LAB
FUNGUS (MYCOLOGY) CULTURE: NORMAL
FUNGUS STAIN: NORMAL

## 2019-06-18 LAB
AFB CULTURE (MYCOBACTERIA): NORMAL
AFB SMEAR: NORMAL

## 2020-07-14 ENCOUNTER — HOSPITAL ENCOUNTER (OUTPATIENT)
Dept: GENERAL RADIOLOGY | Age: 65
Discharge: HOME | End: 2020-07-16
Payer: COMMERCIAL

## 2020-07-14 ENCOUNTER — HOSPITAL ENCOUNTER (OUTPATIENT)
Age: 65
Discharge: HOME | End: 2020-07-16
Payer: COMMERCIAL

## 2020-07-14 PROCEDURE — 71046 X-RAY EXAM CHEST 2 VIEWS: CPT

## 2023-03-27 ENCOUNTER — HOSPITAL ENCOUNTER (OUTPATIENT)
Dept: CARDIOLOGY | Age: 68
Discharge: HOME | End: 2023-03-27
Payer: MEDICARE

## 2023-03-27 LAB
LV EF: 65 %
LVEF MODALITY: NORMAL

## 2023-03-27 PROCEDURE — 93306 TTE W/DOPPLER COMPLETE: CPT

## 2023-12-13 ENCOUNTER — HOSPITAL ENCOUNTER (EMERGENCY)
Age: 68
Discharge: HOME | End: 2023-12-13
Payer: MEDICARE

## 2023-12-13 ENCOUNTER — APPOINTMENT (OUTPATIENT)
Dept: ULTRASOUND IMAGING | Age: 68
End: 2023-12-13
Payer: MEDICARE

## 2023-12-13 ENCOUNTER — APPOINTMENT (OUTPATIENT)
Dept: GENERAL RADIOLOGY | Age: 68
End: 2023-12-13
Payer: MEDICARE

## 2023-12-13 VITALS
TEMPERATURE: 97.8 F | SYSTOLIC BLOOD PRESSURE: 128 MMHG | HEART RATE: 89 BPM | DIASTOLIC BLOOD PRESSURE: 89 MMHG | BODY MASS INDEX: 28.05 KG/M2 | RESPIRATION RATE: 16 BRPM | WEIGHT: 130 LBS | HEIGHT: 57 IN | OXYGEN SATURATION: 98 %

## 2023-12-13 DIAGNOSIS — M25.561 ACUTE PAIN OF RIGHT KNEE: Primary | ICD-10-CM

## 2023-12-13 PROCEDURE — 73562 X-RAY EXAM OF KNEE 3: CPT

## 2023-12-13 PROCEDURE — 93971 EXTREMITY STUDY: CPT

## 2023-12-13 PROCEDURE — 99284 EMERGENCY DEPT VISIT MOD MDM: CPT

## 2023-12-13 ASSESSMENT — PAIN SCALES - GENERAL: PAINLEVEL_OUTOF10: 3

## 2023-12-13 ASSESSMENT — PAIN - FUNCTIONAL ASSESSMENT: PAIN_FUNCTIONAL_ASSESSMENT: 0-10

## 2023-12-13 ASSESSMENT — PAIN DESCRIPTION - LOCATION: LOCATION: LEG

## 2023-12-13 ASSESSMENT — PAIN DESCRIPTION - DESCRIPTORS: DESCRIPTORS: ACHING

## 2023-12-13 ASSESSMENT — PAIN DESCRIPTION - ORIENTATION: ORIENTATION: RIGHT

## 2023-12-13 NOTE — ED PROVIDER NOTES
is agreeable with plan of care. CONSULTS: (Who and What was discussed)  None        I am the Primary Clinician of Record. FINAL IMPRESSION      1.  Acute pain of right knee          DISPOSITION/PLAN     DISPOSITION Decision To Discharge 12/13/2023 05:36:48 PM      PATIENT REFERRED TO:  Franck Chilel MD  [address]  [phone]    Call   physician follow up      DISCHARGE MEDICATIONS:  New Prescriptions    No medications on file       DISCONTINUED MEDICATIONS:  Discontinued Medications    No medications on file              (Please note that portions of this note were completed with a voice recognition program.  Efforts were made to edit the dictations but occasionally words are mis-transcribed.)    JAHAIRA Marx CNP (electronically signed)          JAHAIRA Marx CNP  12/13/23 [id number]

## 2023-12-13 NOTE — ED NOTES
Discharge instructions given. Patient verbalizes understanding. No other noted or stated problems at this time. Patient will follow up with primary care.      Darrin Oreilly RN  12/13/23 [id number]

## 2024-01-27 NOTE — CARE COORDINATION
4/8  Transition of care notes  Presented to [address] [place] [address] Rd  Per notes was being treated at home on po antibiotic  Returned to urgent care with sats 83--87  Ct chest done shows lg pericardial effusion  Transfer to [facility]  Admit to icu  This am in or for pericardial window  Back in icu  Chest tube  Ancef iv  Met with spouse  Patient sleeping post op  Lives at home one story  No equipment at home  No history of home care or [facility]  Only takes med for cholesterol at home   Was independent at home  Explained to spouse briefly about procedure and chest tube  Zoe Flores RN Case Manager
4/9  Transition of care notes  Remains in icu  Chest tube intact  Met with patient  Alert  [place]  Denies sob  Per cts=  A/P: Stable s/p subxiphoid pericardial window, POD#1  Chest tube without significant drainage. Chest tube removed without difficulty.  Patient tolerated well  Will schedule f/u in office for suture removal    [id number] [address] for gen floor transfer  Plan is for home  Spouse will transport  CRISTY Loera RN Case Manager
Social work followed up with patient in room. Confirmed plan is home with [facility]. SW spoke with [facility] liaison to make aware of plan for dc home today. Charge RN made aware need orders.   Electronically signed by MANDY Kumar on 4/10/2019 at 11:56 AM
(2) spontaneous and intermittent (24 hrs old)

## (undated) DEVICE — BLADE ES L2.44IN S STL HEX LOK DISP VALLEYLAB

## (undated) DEVICE — SYRINGE MED 10ML POLYPR LUERSLIP TIP FLAT TOP W/O SFTY DISP

## (undated) DEVICE — 3M™ MEDIPORE™ + PAD 3564: Brand: 3M™ MEDIPORE™

## (undated) DEVICE — CHLORAPREP 26ML ORANGE

## (undated) DEVICE — GLOVE ORANGE PI 7 1/2   MSG9075

## (undated) DEVICE — INTENDED FOR TISSUE SEPARATION, AND OTHER PROCEDURES THAT REQUIRE A SHARP SURGICAL BLADE TO PUNCTURE OR CUT.: Brand: BARD-PARKER ® STAINLESS STEEL BLADES

## (undated) DEVICE — SURGICAL PROCEDURE PACK BASIC

## (undated) DEVICE — CATHETER THORACENTESIS STR 28 FRX23 IN 6 EYELET TAPR TIP LF

## (undated) DEVICE — TRAP,MUCUS SPECIMEN,40CC: Brand: MEDLINE

## (undated) DEVICE — DRAIN SURG SGL COLL PT TB FOR ATS BG OASIS

## (undated) DEVICE — SOLUTION IV IRRIG WATER 1000ML POUR BRL 2F7114

## (undated) DEVICE — GLOVE ORANGE PI 7   MSG9070

## (undated) DEVICE — CLEANER,CAUTERY TIP,2X2",STERILE: Brand: MEDLINE

## (undated) DEVICE — 3M™ IOBAN™ 2 ANTIMICROBIAL INCISE DRAPE 6650EZ: Brand: IOBAN™ 2

## (undated) DEVICE — SET CARDIAC II

## (undated) DEVICE — 1.5L THIN WALL CAN: Brand: CRD

## (undated) DEVICE — GARMENT,MEDLINE,DVT,INT,CALF,MED, GEN2: Brand: MEDLINE

## (undated) DEVICE — SKIN AFFIX SURG ADHESIVE 72/CS 0.55ML: Brand: MEDLINE

## (undated) DEVICE — PACK,UNIV, II AURORA: Brand: MEDLINE

## (undated) DEVICE — SET CARDIAC I

## (undated) DEVICE — DRAIN SURG L3/8-1/2IN DIA3/16IN SIL CARD CONN 1:1 BLAK

## (undated) DEVICE — KIT SURG W7XL11IN 2 PKT UNTREATED NA

## (undated) DEVICE — GOWN,SIRUS,FABRNF,XL,20/CS: Brand: MEDLINE

## (undated) DEVICE — Z CONVERTED USE 2275207 CLOTH PREP W7.5XL7.5IN 2% CHG SKIN ALC AND RNS FREE

## (undated) DEVICE — BLADE CLIPPER GEN PURP NS

## (undated) DEVICE — STERILE LATEX POWDER FREE SURGICAL GLOVES WITH HYDROGEL COATING: Brand: PROTEXIS

## (undated) DEVICE — GLOVE SURG SZ 7.5 L11.73IN FNGR THK9.8MIL STRW LTX POLYMER

## (undated) DEVICE — Z INACTIVE USE 2641837 CLIP LIG M BLU TI HRT SHP WIRE HORZ 600 PER BX

## (undated) DEVICE — DRESSING FOAM W22XL25CM FILVE LAYR FOAM DP DEF SAFETAC

## (undated) DEVICE — PATIENT RETURN ELECTRODE, SINGLE-USE, CONTACT QUALITY MONITORING, ADULT, WITH 9FT CORD, FOR PATIENTS WEIGING OVER 33LBS. (15KG): Brand: MEGADYNE

## (undated) DEVICE — GLOVE SURG SZ 65 THK91MIL LTX FREE SYN POLYISOPRENE

## (undated) DEVICE — GOWN,SIRUS,FABRNF,L,20/CS: Brand: MEDLINE

## (undated) DEVICE — MEDI-TRACE CADENCE ADULT, PRE-CONNECT  DEFIBRILLATION ELECTRODE (10 PR/PK) - PHYSIO-CONTROL: Brand: MEDI-TRACE CADENCE